# Patient Record
Sex: MALE | Race: WHITE
[De-identification: names, ages, dates, MRNs, and addresses within clinical notes are randomized per-mention and may not be internally consistent; named-entity substitution may affect disease eponyms.]

---

## 2018-06-14 ENCOUNTER — HOSPITAL ENCOUNTER (OUTPATIENT)
Dept: HOSPITAL 53 - M SFHCLERA | Age: 78
End: 2018-06-14
Attending: FAMILY MEDICINE
Payer: MEDICARE

## 2018-06-14 DIAGNOSIS — Z79.01: ICD-10-CM

## 2018-06-14 DIAGNOSIS — Z51.81: ICD-10-CM

## 2018-06-14 DIAGNOSIS — Z86.718: ICD-10-CM

## 2018-06-14 DIAGNOSIS — F43.21: ICD-10-CM

## 2018-06-14 DIAGNOSIS — M79.89: ICD-10-CM

## 2018-06-14 DIAGNOSIS — I10: Primary | ICD-10-CM

## 2018-06-14 LAB
ALBUMIN/GLOBULIN RATIO: 1 (ref 1–1.93)
ALBUMIN: 3.5 GM/DL (ref 3.2–5.2)
ALKALINE PHOSPHATASE: 89 U/L (ref 45–117)
ALT SERPL W P-5'-P-CCNC: 32 U/L (ref 12–78)
ANION GAP: 9 MEQ/L (ref 8–16)
AST SERPL-CCNC: 23 U/L (ref 7–37)
BASO #: 0 10^3/UL (ref 0–0.2)
BASO %: 0.4 % (ref 0–1)
BILIRUBIN,TOTAL: 0.3 MG/DL (ref 0.2–1)
BLOOD UREA NITROGEN: 25 MG/DL (ref 7–18)
CALCIUM LEVEL: 8.7 MG/DL (ref 8.8–10.2)
CARBON DIOXIDE LEVEL: 26 MEQ/L (ref 21–32)
CHLORIDE LEVEL: 109 MEQ/L (ref 98–107)
CHOLEST SERPL-MCNC: 210 MG/DL (ref ?–200)
CHOLESTEROL RISK RATIO: 4.77 (ref ?–5)
CREAT UR-MCNC: 131 MG/DL
CREATININE FOR GFR: 1.33 MG/DL (ref 0.7–1.3)
EOS #: 0.1 10^3/UL (ref 0–0.5)
EOSINOPHIL NFR BLD AUTO: 1.7 % (ref 0–3)
EST. AVERAGE GLUCOSE BLD GHB EST-MCNC: 123 MG/DL (ref 60–110)
GFR SERPL CREATININE-BSD FRML MDRD: 55.2 ML/MIN/{1.73_M2} (ref 42–?)
GLUCOSE, FASTING: 114 MG/DL (ref 70–100)
HDLC SERPL-MCNC: 44 MG/DL (ref 40–?)
HEMATOCRIT: 40.1 % (ref 42–52)
HEMOGLOBIN: 13.5 G/DL (ref 13.5–17.5)
IMMATURE GRANULOCYTE %: 0.4 % (ref 0–3)
LDL CHOLESTEROL: 132.8 MG/DL (ref ?–100)
LYMPH #: 1.8 10^3/UL (ref 1.5–4.5)
LYMPH %: 25.4 % (ref 24–44)
MEAN CORPUSCULAR HEMOGLOBIN: 32.6 PG (ref 27–33)
MEAN CORPUSCULAR HGB CONC: 33.7 G/DL (ref 32–36.5)
MEAN CORPUSCULAR VOLUME: 96.9 FL (ref 80–96)
MICROALBUMIN UR-MCNC: 16.1 MG/L
MICROALBUMIN/CREAT UR: 12.2 MCG/MG (ref 0–30)
MONO #: 1 10^3/UL (ref 0–0.8)
MONO %: 14.6 % (ref 0–5)
NEUTROPHILS #: 4.1 10^3/UL (ref 1.8–7.7)
NEUTROPHILS %: 57.5 % (ref 36–66)
NONHDLC SERPL-MCNC: 166 MG/DL
NRBC BLD AUTO-RTO: 0 % (ref 0–0)
PLATELET COUNT, AUTOMATED: 199 10^3/UL (ref 150–450)
POTASSIUM SERUM: 4.4 MEQ/L (ref 3.5–5.1)
RED BLOOD COUNT: 4.14 10^6/UL (ref 4.3–6.1)
RED CELL DISTRIBUTION WIDTH: 14.3 % (ref 11.5–14.5)
SODIUM LEVEL: 144 MEQ/L (ref 136–145)
THYROID STIMULATING HORMONE: 3.33 UIU/ML (ref 0.36–3.74)
TOTAL PROTEIN: 7 GM/DL (ref 6.4–8.2)
TRIGLYCERIDES LEVEL: 166 MG/DL (ref ?–150)
WHITE BLOOD COUNT: 7.1 10^3/UL (ref 4–10)

## 2018-06-14 PROCEDURE — 84443 ASSAY THYROID STIM HORMONE: CPT

## 2019-11-11 NOTE — REP
Right lower extremity duplex venous ultrasound:

 

History:  History of chronic DVT right lower extremity.  No comparison study.

 

Findings:  There is a small area of focal wall irregularity in the distal femoral

vein with lack of complete.  Color Doppler filling.  This is compatible with some

residual mural thrombus or mural thickening post old DVT.  No acute venous

thrombosis is seen.  The veins are otherwise anechoic and fully compressible on

two-dimensional scanning.  Color flow imaging is otherwise negative.  Spectral

Doppler interrogation demonstrates intact respiratory variation in flow normal

manual augmentation of flow.

 

Impression:

 

There is no evidence of acute deep venous thrombosis.  There is a small 1 cm area

of wall thickening in the distal femoral vein which may be a reflection of prior

DVT with chronic wall thickening.

 

 

Electronically Signed by

Johan Oviedo MD 11/11/2019 09:15 A

## 2020-10-13 NOTE — ECGEPIP
[facility] - ED

                                       

                                       Test Date:    2020-10-13

Pat Name:     VIRGINIA MEDRANO          Department:   

Patient ID:   [id number]                 Room:         -

Gender:       Male                     Technician:   JBEN

:          1940               Requested By: MARYCRUZ BARNES

Order Number: [id number]     Reading MD:   Maria Luisa Zhang

                                 Measurements

Intervals                              Axis          

Rate:         79                       P:            53

NY:           146                      QRS:          0

QRSD:         85                       T:            17

QT:           362                                    

QTc:          417                                    

                           Interpretive Statements

SINUS RHYTHM

NO PRIOR

Electronically Signed on 10- 20:53:26 EDT by Maria Luisa Zhang

## 2020-10-13 NOTE — HPEPDOC
[facility] Medical History & Physical


Date of Admission


Oct 13, 2020


Date of Service:  Oct 13, 2020





History and Physical


CHIEF COMPLAINT: Sent by PCP for symptomatic anemia, abnormal Hgb.





HISTORY OF PRESENT ILLNESS: 79 yo male sent by his PCP on routine follow up of 

blood work which showed anemia with Hgb 6.1 in office. Patient does note one 

week of dizziness and light headedness. He is not sure if he is taking any blood

thinners. He was diagnosed with an unprovoked RLE DVT in 2013, and was on 

Xarelto. The DOAC was discontinued secondary to lower GI bleed/melena? bleeding 

risk? in November 2019. He is not aware currently of any bloody stools. Physical

exam in ED did reveal gross blood in the rectal vault. He denies chest pain, 

changes in vision, falls.





PAST MEDICAL HISTORY:


#unprovoked DVT - RLE - 2013, DOAC was discontinued November 2019 due to high 

risk of bleeding as per PCP notes


#HTN





ALLERGIES: Please see below.





REVIEW OF SYSTEMS:


Negative except as per HPI.





HOME MEDICATIONS: Please see below. 





PHYSICAL EXAMINATION:


VITAL SIGNS: See below


General: NAD, lying comfortably in bed


HEENT: NC/AT, EOMI,k poor dentition


Lungs: CTA B/L


Heart: +S1S2, RRR


Abd: soft, NT, +BS


Ext: no edema








LABORATORY DATA: See below.





MICROBIOLOGY: Please see below. 





ASSESSMENT: 79 yo male sent from his PCP for symptomatic anemia, with PMHx of 

unprovoked RLE DVT, HTN.





#acute blood loss anemia


- transfuse PRBC


- serial H/H


- surgery consultation for possible scope


- NPO/IV fluids





#HTN


- holding home medications - lisinopril, HCTZ





#ELISHA/CKD


- follow BMP


- hold nephrotoxic meds


- CT A/P when H/H improved, or consider bedside US





#DVT prophylaxis


- mechanical





Vital Signs





Vital Signs








  Date Time  Temp Pulse Resp B/P (MAP) Pulse Ox O2 Delivery O2 Flow Rate FiO2


 


10/13/20 15:52   16     


 


10/13/20 15:45    132/61 (84)    


 


10/13/20 15:39  78   98   


 


10/13/20 14:09 99.1     Room Air  











Laboratory Data


Labs 24H


Laboratory Tests 2


10/13/20 14:39: 


Immature Granulocyte % (Auto) 0.6, Neutrophils (%) (Auto) 60.3, Lymphocytes (%) 

(Auto) 20.9L, Monocytes (%) (Auto) 14.8H, Eosinophils (%) (Auto) 3.0, Basophils 

(%) (Auto) 0.4, Neutrophils # (Auto) 4.8, Lymphocytes # (Auto) 1.7, Monocytes # 

(Auto) 1.2H, Eosinophils # (Auto) 0.2, Basophils # (Auto) 0.0, Nucleated Red 

Blood Cells % (auto) 0.0, Prothrombin Time 14.3H, Prothromb Time International 

Ratio 1.09, Activated Partial Thromboplast Time 28.9, Anion Gap 9, Glomerular 

Filtration Rate 21.2L, Calcium Level 8.2L, Total Bilirubin 0.2, Direct Bilirubin

< 0.1, Aspartate Amino Transf (AST/SGOT) 14, Alanine Aminotransferase (ALT/SGPT)

10L, Alkaline Phosphatase 67, Total Creatine Kinase 87, Creatine Kinase MB 1.6, 

Creatine Kinase MB Relative Index 1.84, Troponin I < 0.02, Total Protein 6.2L, 

Albumin 2.5L, Albumin/Globulin Ratio 0.7


CBC/BMP


Laboratory Tests


10/13/20 14:39











Home Medications


Scheduled PRN


Acetaminophen (Tylenol Extra Strength) 500 Mg Tablet, 500 MG PO Q6H PRN for 

HEADACHE





Miscellaneous Medications


Hydrochlorothiazide (Hydrochlorothiazide) 12.5 Mg Capsule


Lisinopril (Lisinopril) 10 Mg Tablet





Allergies


Coded Allergies:  


     sulfamethoxazole (Verified  Allergy, Unknown, 10/13/20)





A-FIB/CHADSVASC


A-FIB History


Current/History of A-Fib/PAF?:  No


Current PO Anticoag Therapy:  ISHA Jones MD              Oct 13, 2020 16:00

## 2020-10-14 NOTE — IPNPDOC
Text Note


Date of Service


The patient was seen on 10/14/20.





NOTE


Subjective:


Patient seen and examined at bedside. No acute overnight events reported. No new

medical complaints. Patient states he had a bowel movement, reported to be 

bubba in color.





Objective:


VITAL SIGNS: See below


General: NAD, lying comfortably in bed, in good spirits


HEENT: NC/AT, EOMI, poor dentition


Lungs: CTA B/L


Heart: +S1S2, RRR


Abd: soft, NT, +BS


Ext: no edema





ASSESSMENT: 81 yo male sent from his PCP for symptomatic anemia, with PMHx of 

unprovoked RLE DVT, HTN.





#acute blood loss anemia


- s/p 2 units PRBC


- serial H/H


- surgery consultation for scope


- NPO/IV fluids


- in ED reported to have bright red blood in rectal vault, last BM reported to 

be bubba in color





#HTN


- holding home medications - lisinopril, HCTZ





#ELISHA/CKD


- creatinine worsened


- follow BMP


- hold nephrotoxic meds


- UA pending


- nephrology c/s pending


- CT A/P when H/H improved, or consider bedside US





#DVT prophylaxis


- mechanical





VS,Fishbone, I+O


VS, Fishbone, I+O


Laboratory Tests


10/13/20 14:39








10/13/20 21:07








10/14/20 04:48











Vital Signs








  Date Time  Temp Pulse Resp B/P (MAP) Pulse Ox O2 Delivery O2 Flow Rate FiO2


 


10/14/20 08:00 98.4 65 16 145/69 (94) 97 Room Air  














I&O- Last 24 Hours up to 6 AM 


 


 10/14/20





 06:00


 


Intake Total 3395 ml


 


Balance 3395 ml

















ISHA CAMEJO MD              Oct 14, 2020 10:02

## 2020-10-14 NOTE — CR
DATE OF CONSULTATION:  10/13/2020



REASON FOR CONSULTATION:  Requesting by Dr. Timmons in the Intensive Care Unit 
for marked anemia and possible gastrointestinal (GI) bleeding.

 

HISTORY OF PRESENT ILLNESS:  The patient is an 80-year-old man who was admitted 
from the emergency department by Dr. Timmons today for marked anemia.  He had 
been seen by his primary physician last Thursday or Friday, the 8th or 9th of October.  Some blood work was ordered which was done on the 12th and revealed a 
markedly low hemoglobin. He was called and advised to report to the emergency 
department.  In the [facility] Emergency Department at approximately 2:30 in the 
afternoon on the 13th, he had a CBC that showed a hemoglobin of 5.8 with a 
hematocrit of 19. He was therefore admitted by the hospitalist for management 
with transfusion.  The patient does report that he has had some lightheadedness 
recently. He has noted some dark stool but with me denied having noted any 
definite bleeding.  He does report that he has had problems with leakage of 
stool and incontinence of stool over the last six months or so. He does report 
that over the last few months he has also had two episodes of falling perhaps 
most recently about a month ago though he is somewhat unclear on the timing.  He
denies any abdominal pain and has had no nausea or vomiting.  He has been eating
well.  So far he has received 2 units of packed red blood cells and is pending 
recheck of his hemoglobin and hematocrit.  The patient does report that his 
father had colon cancer at about age 79. The patient reports he has never had a 
colonoscopy though it was apparently recommended at several points in his care.



MEDICATIONS PRIOR TO ADMISSION: 

-   Lisinopril 10 mg p.o. daily 

-   Hydrochlorothiazide 12.5 mg daily 

-   Tylenol as needed for headache 



ALLERGIES:  Only reported allergy is to SULFAMETHAXOZOLE.

 

SURGICAL HISTORY:  Entirely negative. 



MEDICAL HISTORY:  The patient has a history of hypertension.  He had a right 
lower extremity deep venous thrombosis back in 2013 for which he received 
anticoagulation. This was discontinued in November of 2019 as it was apparently 
estimated that his risk of bleeding was perhaps higher than his risk from a 
repeat DVT. 



FAMILY HISTORY:  Significant in that his father had colon cancer.



SOCIAL HISTORY:  The patient denies any smoking. He denies significant alcohol 
use. The patient lives alone in a trailer home.  He has been responsible for all
of his own mowing and snow blowing but is now feeling somewhat weaker and has 
been using a cane for ambulation and feeling less secure at home.



REVIEW OF SYSTEMS:   Shows no chest pain or palpitations.  He denies seizure or 
stroke. He does report some pain in his hips with ambulation with weakness in 
the muscles.  He has been very troubled by some loss of control of his bowel and
bladder function over the last six months or so.  He has bee n able to eat and 
denies any nausea or vomiting, fevers or chills.



PHYSICAL EXAMINATION:   

General: A somewhat frail-appearing older man.  He is lying quietly on the 
hospital bed when I walked in.

 His most recent vital signs show a temperature of 98.4, pulse of 67, 
respirations of 20 and a blood pressure of 152/73. 

Skin is warm and dry. He does appear somewhat disheveled and unshaven. His teeth
are in poor repair. Sclerae are anicteric.

Neck is without bruits or mass.

Heart exam shows a regular rhythm.

The lungs show clear breath sounds bilaterally.

The abdomen is quite protuberant.  He has bowel sounds present.  The abdomen is 
nontender to palpation and is soft.  There is no definite hernia identified.  

Genital and rectal exams are deferred at this time. 

Extremities show no edema and he has palpable radial and dorsalis pedis pulses 
bilaterally. 



LABORATORY DATA:  Laboratory studies show a hemoglobin of 5.8, hematocrit of 19 
and a white count of 8.0 at the time of admission. His platelet count is 239 and
the differential count shows 60% neutrophils, 21% lymphocytes and 15% monocytes.
 Chemistry profile showed sodium 139, potassium 4.0, chloride 106, CO2 of 24, 
BUN of 54, creatinine 3.0 and a glucose of 127.  Liver function tests are 
normal. Total protein is 6.2 with an albumin of 2.5.  Coagulation studies show a
PT of 14.3 but an INR of 1.0. Review of the patient's records over the last two 
years showed that his chemistries revealed a BUN of 28 and a creatinine of 1.5 
on his last available labs in November of 2019.



IMPRESSION:  The patient has fairly marked anemia with a hemoglobin of 5.8 and a
hematocrit of 19. He has not had any obvious brisk bleeding but has noted some 
dark stool. He has been somewhat lightheaded at times recently and did have two 
falls within the last couple months.  He has some degree of chronic kidney 
disease with a normal BUN one year ago of 28 and a creatinine of 1.5 and those 
have now elevated to 54 and 3.0.  It is unclear how much of this represents an 
acute change possibly related to bleeding and how much of this might represent a
chronic decrease in his renal function. The patient has a family history of 
colon cancer in his father and has noted some changes in his bowel habits with 
loss of control of his bowel function over the last six months or so.  His 
abdomen is quite protuberant but I do not feel a definite mass.  I think he 
certainly is at a significant risk for colorectal cancer and this is certainly a
very valid concern.



PLAN:  I discussed with the patient scheduling him for a colonoscopy and 
possibly an accompanying upper endoscopy to look for any source of bleeding.  He
is agreeable with this plan although he does allow that if we identified a 
tumor, he does not believe he would want to have any treatment for it at his 
age. I certainly agree with a transfusion at this point to get him to a safe 
level. We will see how his renal functions level out with transfusion and 
hydration.  I will need to find time in the endoscopy schedule and this will 
likely be within the next 48 hours. 

ALYSON

## 2020-10-15 NOTE — CR
DATE OF CONSULTATION:  10/14/2020



CONSULTATION REQUESTED BY:  Jarrell Timmons M.D. 



REASON FOR CONSULTATION:  Acute renal failure.



HISTORY OF PRESENT ILLNESS:  Mr. Hanna is an 80-year-old gentleman with known
history of prior DVT, hypertension and chronic kidney disease. He is admitted 
due to severe anemia as he was noticed to have a hemoglobin of 6.1 by his 
primary care physician. He was sent to the Emergency Room and has been admitted.
He has been transfused and currently waiting for possible endoscopy. Patient was
on Xarelto for history of DVT in 2013, which is currently on hold. A nephrology 
consultation was requested this morning and patient is seen in the Intensive 
Care Unit on his bedside. 



PAST MEDICAL AND SURGICAL HISTORY:  Significant for:

1.   History of DVT in right lower extremity in 2013.

2.   History of chronic kidney disease at baseline.

3.   History of hypertension.



FAMILY HISTORY:  Noncontributory for this admission.



PERSONAL AND SOCIAL HISTORY:  Patient denies any alcohol or drug use. 



ALLERGIES:  Patient has allergy to sulfa.



HOME MEDICATIONS:  Included:

1.   Lisinopril 10 mg daily.

2.   Hydrochlorothiazide 12.5 mg daily.

3.   Tylenol as needed for pain.

4.   Xarelto.



REVIEW OF SYSTEMS:  At present, patient denies any dyspnea, chest pain, fever or
chills. Ears, Nose and Throat unremarkable. He denies any headache. Respiratory 
system is negative for cough or hemoptysis. GI system is significant for 
hemorrhoids with history of bleeding. He denies any black colored stools. There 
is no nausea or vomiting.  system is significant for decreased urine output. 
Musculoskeletal system is negative for leg edema. He denies any use of NSAID. 
Endocrine system negative for diabetes or thyroid problems. Hematological system
is significant for severe anemia, requiring transfusions. Psychosocial system 
negative for depression or anxiety. Neurological system negative for seizures or
stroke.  Skin is negative for rash or ulcers.



PHYSICAL EXAMINATION: 

GENERAL: An elderly gentleman lying in the bed without any acute distress. 

VITALS: Temperature 98.4 degrees Fahrenheit, heart rate 65 per minute, 
respiratory rate 16 per minute, blood pressure 145/69 mmHg and oxygen saturation
97% on room air. 

HEENT: Head is atraumatic. Neck is supple and without JVD or thyroid 
enlargement. Pupils equal and reactive to light. Sclerae anicteric. 

HEART: Sounds are regular.

LUNGS: Sound clear to auscultation.

ABDOMEN: Soft and nontender. Bowel sounds are normal. There is no palpable 
organomegaly. 

EXTREMITIES: No cyanosis or clubbing. 

NEUROLOGIC: He is awake, alert and oriented x3. 



LABORATORY DATA:  On admission, hemoglobin 5.8 and hematocrit 19. Platelets 
239,000. Today hemoglobin 7.9 and hematocrit 24.8. Sodium 141, potassium 4.1, 
CO2 21, BUN 58, creatinine 3.65, calcium 7.7. Iron 116, saturation 45%. Total 
protein 5.6 and albumin 2.0. . There is no urinalysis at this point.



PROBLEMS:  

1.  Acute renal failure possibly superimposed on chronic kidney disease: Most 
likely this is related to acute blood loss and severe anemia. He was most likely
dehydrated. Patient was also using ACE inhibitor and diuretic at home. I agree 
with stopping both the ACE inhibitor and diuretic. I will recommend aggressive 
I.V. fluid hydration in addition to transfusion to maintain his hemoglobin above
8 and systolic blood pressure about 130 mmHg. A urinalysis is being ordered to 
further assess his baseline kidney function for possible proteinuria or 
hematuria.

2.  Hypertension: Blood pressure is reasonably well controlled and at this point
I will hold off on diuretic and ACE inhibitor. 

3.  Acute blood loss anemia: Patient has been transfused and is likely to 
require further transfusion. I would suggest to maintain his hemoglobin at least
above 8.0.  He is n.p.o. at present, waiting for possible endoscopy. He is quite
upset about being n.p.o. 



Thank you for involving me in the care of Mr. Hanna. I will follow him along 
with you.

ALYSON

## 2020-10-15 NOTE — IPNPDOC
Text Note


Date of Service


The patient was seen on 10/15/20.





NOTE


Subjective:


Patient seen and examined at bedside. No acute overnight events reported. No new

medical complaints.





Objective:


VITAL SIGNS: See below


General: NAD, lying comfortably in bed, in good spirits


HEENT: NC/AT, EOMI, poor dentition


Lungs: CTA B/L


Heart: +S1S2, RRR


Abd: soft, NT, +BS


Ext: no edema





ASSESSMENT: 81 yo male sent from his PCP for symptomatic anemia, with PMHx of 

unprovoked RLE DVT, HTN.





#acute blood loss anemia


- s/p 2 units PRBC


- H/H still trending down - transfuse additional 1 PRBC today


- surgery consultation for scope - plan for colonoscopy/EGD today


- clear liquids





#HTN


- holding home medications - lisinopril, HCTZ





#ELISHA/CKD


- creatinine worsened


- follow BMP


- hold nephrotoxic meds


- UA noted


- nephrology c/s appreciated


- CT A/P when H/H improved, or consider bedside US





#DVT prophylaxis


- mechanical





VS,Fishbone, I+O


VS, Fishbone, I+O


Laboratory Tests


10/14/20 13:46








10/14/20 22:22








10/15/20 05:31











Vital Signs








  Date Time  Temp Pulse Resp B/P (MAP) Pulse Ox O2 Delivery O2 Flow Rate FiO2


 


10/15/20 06:00 99.4 71 18 140/70 (93) 98 Room Air  














I&O- Last 24 Hours up to 6 AM 


 


 10/15/20





 06:00


 


Intake Total 2910 ml


 


Output Total 210 ml


 


Balance 2700 ml

















ISHA CMAEJO MD              Oct 15, 2020 10:17

## 2020-10-15 NOTE — ROOR
________________________________________________________________________________

Patient Name: Gustavo Hanna          Procedure Date: 10/15/2020 5:43 PM

MRN: [id number]                          Account Number: [id number]

YOB: 1940               Age: 80

                                       Gender: Male

Note Status: Finalized                 

________________________________________________________________________________

 

Procedure:           Colonoscopy

Indications:         Rectal bleeding, Iron deficiency anemia secondary to 

                     chronic blood loss

Providers:           Dionicio Hay MD

Referring MD:        Jarrell Timmons MD

Requesting Provider: 

Medicines:           Monitored Anesthesia Care

Complications:       No immediate complications.

________________________________________________________________________________

Procedure:           Pre-Anesthesia Assessment:

                     - Prior to the procedure, a History and Physical was 

                     performed, and patient medications and allergies were 

                     reviewed. The patient is competent. The risks and 

                     benefits of the procedure and the sedation options and 

                     risks were discussed with the patient. All questions were 

                     answered and informed consent was obtained. Patient 

                     identification and proposed procedure were verified by 

                     the physician, the nurse and the anesthetist in the 

                     procedure room. Mental Status Examination: alert and 

                     oriented. Prophylactic Antibiotics: The patient does not 

                     require prophylactic antibiotics. Prior Anticoagulants: 

                     The patient has taken no previous anticoagulant or 

                     antiplatelet agents. ASA Grade Assessment: III - A 

                     patient with severe systemic disease. After reviewing the 

                     risks and benefits, the patient was deemed in 

                     satisfactory condition to undergo the procedure. The 

                     anesthesia plan was to use monitored anesthesia care 

                     (MAC). Immediately prior to administration of 

                     medications, the patient was re-assessed for adequacy to 

                     receive sedatives. The heart rate, respiratory rate, 

                     oxygen saturations, blood pressure, adequacy of pulmonary 

                     ventilation, and response to care were monitored 

                     throughout the procedure. The physical status of the 

                     patient was re-assessed after the procedure.

                     The Colonoscope was introduced through the anus with the 

                     intention of advancing to the cecum. The scope was 

                     advanced to the descending colon before the procedure was 

                     aborted. Medications were given. The colonoscopy was 

                     unusually difficult due to excessive bleeding, multiple 

                     diverticula in the colon and poor bowel prep with stool 

                     present. Successful completion of the procedure was aided 

                     by lavage.

                                                                                

Findings:

     The digital rectal exam revealed a rectal mass.

     There is a large circumferential rectal mass which begins just above the 

     dentate line and extends as far as I can feel. There is old and fresh 

     blood on the examiing finger.

     Many small and large-mouthed diverticula were found in the sigmoid colon.

     a large amount of brown stool without blood stool was found in the 

     sigmoid colon, interfering with visualization. Lavage of the area was 

     performed using a moderate amount, resulting in incomplete clearance with 

     continued poor visualization.

     An infiltrative, sessile and ulcerated non-obstructing large mass was 

     found in the distal rectum. The mass was circumferential. The mass 

     measured eight cm in length. Oozing was present. Biopsies were taken with 

     a cold forceps for histology.

                                                                                

Impression:          - Rectal mass.

                     - Diverticulosis in the sigmoid colon.

                     - Stool in the sigmoid colon.

                     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 Biopsied.

Recommendation:      - Admit the patient to [facility] for [facility] care.

                                                                                

 

Dionicio Hay MD

__________________

Dionicio Hay MD

10/15/2020 7:26:51 PM

Electronically signed by Dionicio Hay MD

Number of Addenda: 0

 

Note Initiated On: 10/15/2020 5:43 PM

Estimated Blood Loss:

     Estimated blood loss was minimal.

## 2020-10-15 NOTE — REPVR
PROCEDURE INFORMATION: 

Exam: CT Abdomen And Pelvis Without Contrast 

Exam date and time: 10/15/2020 10:55 PM 

Age: 80 years old 

Clinical indication: Abdominal pain; Generalized; Additional info: Oliguria, bi 

bleed, acute kidney injury 



TECHNIQUE: 

Imaging protocol: Computed tomography of the abdomen and pelvis without 

contrast. 

Radiation optimization: All CT scans at this facility use at least one of these 

dose optimization techniques: automated exposure control; mA and/or kV 

adjustment per patient size (includes targeted exams where dose is matched to 

clinical indication); or iterative reconstruction. 



COMPARISON: 

No relevant prior studies available. 



FINDINGS: 

Lungs: Slight bibasilar interstitial coarsening with minimal fibro-atelectatic 

change. 

Pleural space: Trace left pleural effusion which may be loculated. 



Liver: There are several hepatic cysts measuring up to 2.2 cm in the cephalad 

left hepatic lobe with a Hounsfield measurement of -3. 

Gallbladder and bile ducts: Focal gallbladder fundal contraction with wall 

thickening. 

Pancreas: Punctate pancreatic calcifications. 

Spleen: Normal. No splenomegaly. 

Adrenals: Normal. No mass. 

Kidneys and ureters: Small nonobstructing right renal calculus. There is a left 

renal cyst measuring up to 5.0 cm with a Hounsfield measurement of 2 consistent 

with a simple cyst. No follow-up is recommended. Prominent bilateral 

hydronephrosis and hydroureter is which extend to the UVJs. 

Stomach and bowel: Borderline distention of the stomach with food material. 

Density at the cecal tip suggesting prior appendectomy. The air-fluid levels 

throughout the colon. There is colonic diverticulosis without evidence of 

diverticulitis. 

Appendix: The appendix is not seen. 



Intraperitoneal space: Unremarkable. No free air. No significant fluid 

collection. 

Vasculature: Unremarkable. No abdominal aortic aneurysm. 

Lymph nodes: Unremarkable. No enlarged lymph nodes. 

Urinary bladder: There is prominent distention of the urinary bladder which has 

a lobular configuration which are likely the distended diverticula. 

Reproductive: There is moderate enlargement of the prostate, particularly the 

median lobe. 

Bones/joints: Healing fractures 10th and 11th ribs posteriorly and the left 8th 

and 9th ribs laterally. Moderate compression of L2 and mild central compression 

of T8 and slightly decreased height of T9 and T10 which appear to be chronic. 

Soft tissues: Small fat filled umbilical hernia. 



IMPRESSION: 

1. Moderate prostatic enlargement, particularly the median lobe. 

2. Prominent lobular distention of the urinary bladder which extends above the 

umbilicus. 

3. Prominent bilateral hydronephrosis and hydroureter is to the UVJs which is 

likely reflection of bladder distention. 

4. Minimal nonobstructing right renal calculus. 

5. Focal wall thickening and protrusion at the gallbladder fundus suggesting 

adenomyomatosis. 

6. Slight bibasilar interstitial coarsening with minimal fibro-atelectatic 

change and trace left pleural effusion which appears loculated. There are 

adjacent healing fractures of several lower left ribs. 

7. Punctate pancreatic calcifications suggesting previous pancreatitis. 

8. Air-fluid levels throughout much of the colon suggesting diarrhea. 

9. Colonic diverticulosis without diverticulitis. 



COMMENTS: 

Consistent with the American College of Radiology's Incidental Findings 

Committee white paper (J Am Mitesh Radiol 2018): Any incidental renal lesion less 

than 1 cm or classified as too small to characterize, or any incidental cystic 

renal lesion characterized as simple-appearing, is likely benign. No follow-up 

imaging is recommended for these lesions per consensus recommendations based on 

imaging criteria. 



Electronically signed by: Jaron Mauricio On 10/15/2020  23:50:30 PM

## 2020-10-15 NOTE — IPN
DATE:  10/15/2020



Mr. Hanna is seen this morning on his bedside. He is sitting in the chair at 
the time of my visit. He has a Doss catheter, which was placed last night, 
however, has minimal urine output. Patient denies any dyspnea or chest pain. He 
is waiting for endoscopy today. He was transfused 2 units of packed red blood 
cells (RBCs) yesterday due to a hemoglobin of 7.5. He denies any dyspnea or 
chest pain at present. He has no nausea or vomiting and is tolerating liquids 
orally well. 



PHYSICAL EXAMINATION:  Temperature 96.3 degrees Fahrenheit, heart rate 76 per 
minute, and respiratory rate 20 per minute, blood pressure 148/76 mm of mercury,
and oxygen saturation 99% on room air. Head is atraumatic. Neck is supple, and 
jugular venous distention (JVD) is mildly elevated sitting upright. His lungs 
sound relatively clear with only few basilar rales. Heart sounds are regular. 
Abdomen is some, somewhat distended, and bowel sounds are normal. Extremities 
have no cyanosis or clubbing. Neurologically, he is awake, alert, and oriented 
times three. 



Today's labs show WBC count 7.0, hemoglobin 7.5, hematocrit 24.0. Sodium 142, 
potassium 4.8, CO2 of 20, BUN 66, and creatinine 5.27. His iron level was 116 
and saturation 45%. 



PROBLEMS:

1. Acute renal failure superimposed on chronic kidney disease. Patient has 
oliguric acute renal failure without any obvious reason. He did not receive any 
intravenous (IV) contrast and denies using any nonsteroidal anti-inflammatory 
drugs (NSAIDs). He was not on any nephrotoxic medications at home. In any event,
he already has a Doss catheter, so bladder neck obstruction is ruled out. A CT 
scan of abdomen and pelvis is pending at present. Patient denies any overt 
uremic symptoms. I have discussed with him and explained to him about potential 
need for dialysis if his kidney function does not improve. So far, his 
electrolytes are stable, and he does not seem to have any significant metabolic 
acidosis. He is clinically not volume overloaded.



2. Anemia. His anemia is stable but not improved. We will give him one more unit
of packed RBCs today and followup with his complete blood count (CBC).



3. Hypertension. Blood pressure seems reasonably well controlled. We will 
continue to monitor closely and adjust his medications as needed. At home he was
taking lisinopril and hydrochlorothiazide, and both of them are currently 
stopped due to acute kidney injury.

ALYSON

## 2020-10-16 NOTE — REPVR
PROCEDURE INFORMATION: 

Exam: US Abdomen, Limited; Right Upper Quadrant 

Exam date and time: 10/16/2020 6:12 PM 

Age: 80 years old 

Clinical indication: Abnormal findings; Abnormal radiologic finding of the 

abdomen; Radiologic exam and body structure: CT abdomen/pelvis; Additional 

info: Further eval liver cysts on CT 



TECHNIQUE: 

Imaging protocol: US abdomen. Real time ultrasound with image documentation. 

Limited exam focused on the right upper quadrant. 



COMPARISON: 

CT ABD PELVIS W/O CONTRAST 10/15/2020 10:49 PM 



FINDINGS: 

Liver: Scattered simple cysts, measuring up to 2.3 x 1.5 x 2.6 cm in the left 

hepatic lobe. 

Gallbladder: No gallstones. No gallbladder wall thickening or pericholecystic 

fluid. Negative sonographic Tamayo's sign, as per the performing sonographer. 

Common bile duct: No stones. No ductal dilatation. 

Pancreas: Unremarkable as visualized. 

Right kidney: 1 x 0.6 x 0.8 cm cyst in the midportion. No solid mass. No 

definite stones. Moderate hydronephrosis. 



IMPRESSION: 

1. Moderate right-sided hydronephrosis. 

2. Scattered simple hepatic cysts.

3. Additional findings, as above. 



Electronically signed by: J Luis Truong On 10/16/2020  18:28:35 PM

## 2020-10-16 NOTE — IPNPDOC
Text Note


Date of Service


The patient was seen on 10/16/20.





NOTE


Subjective:


Patient seen and examined at bedside. No acute overnight events reported. No new

medical complaints.





Objective:


VITAL SIGNS: See below


General: NAD, lying comfortably in bed, in good spirits


HEENT: NC/AT, EOMI, poor dentition


Lungs: CTA B/L


Heart: +S1S2, RRR


Abd: soft, NT, +BS


Ext: no edema





ASSESSMENT: 81 yo male sent from his PCP for symptomatic anemia, with PMHx of 

unprovoked RLE DVT, HTN.





#acute blood loss anemia


- s/p 3 units PRBC - relative stable at this time


- s/p colonoscopy - revealed rectal mass





#rectal mass


- follow as per surgery - suspect will require surgery to remove entire rectum


- oncology c/s pending - spoke with Dr. Sim - assistance appreciated - CT 

chest pending, CEA pending





#liver lesions


- cysts? metastatic disease


- liver US pending for further eval





#prostate disease


- PSA screen pending


- urology c/s 





#ELISHA/CKD


#obstructive uropathy


- CT A/P noted from yesterday


- minimally improved today


- laureano catheter place - follow as per urology


- creatinine slightly better - follow as per nephrology - assistance appreciated





#HTN


- holding home medications - lisinopril, HCTZ


- if needed will consider amlodipine





#DVT prophylaxis


- mechanical





VS,Fishbone, I+O


VS, Fishbone, I+O


Laboratory Tests


10/16/20 00:10








10/16/20 07:48











Vital Signs








  Date Time  Temp Pulse Resp B/P (MAP) Pulse Ox O2 Delivery O2 Flow Rate FiO2


 


10/16/20 06:00 97.3 78 18 145/75 (98) 98 Room Air  














I&O- Last 24 Hours up to 6 AM 


 


 10/16/20





 06:00


 


Intake Total 1105 ml


 


Output Total 2175 ml


 


Balance -1070 ml

















ISHA CAMEJO MD              Oct 16, 2020 12:09

## 2020-10-16 NOTE — REPVR
PROCEDURE INFORMATION: 

Exam: CT Chest Without Contrast 

Exam date and time: 10/16/2020 1:08 PM 

Age: 80 years old 

Clinical indication: Evaluate metastatic disease. Recently found rectal mass. 



TECHNIQUE: 

Imaging protocol: Computed tomography of the chest without contrast. Coronal 

and sagittal reformats were created and reviewed. 

3D rendering (Not supervised by radiologist): MIP and/or 3D reconstructed 

images were created by the technologist. 

Radiation optimization: All CT scans at this facility use at least one of these 

dose optimization techniques: automated exposure control; mA and/or kV 

adjustment per patient size (includes targeted exams where dose is matched to 

clinical indication); or iterative reconstruction. 



COMPARISON: 

1. CR Chest, 2 view PA, Lat 12/5/2013 3:13 PM 

2. CT ABD PELVIS W/O CONTRAST 10/15/2020 10:49:55 PM 



FINDINGS: 

Thyroid: Unremarkable as visualized. 

Lungs: Small retained secretions or aspirated fluid within the trachea and left 

mainstem bronchus. Very mild bilateral lower lobe dependent atelectasis. Left 

upper lobe spiculated solid 6 mm nodule (series 201, image 23). Left upper lobe 

apical ground-glass 5 mm nodule (image 20). Right lower lobe solid 3 mm nodule 

(image 55). Right lower lobe solid juxtapleural 4 mm nodule (image 42). There 

are multiple pulmonary micronodules (measuring less than 3 mm) some examples 

include: left upper lobe images 17, 26, and 51; right middle lobe image 66. 

Pleural space: Very small dependently layering low-attenuation bilateral 

pleural effusions. No pneumothorax. Mild biapical pleural/parenchymal scarring. 

Heart: Coronary arterial atherosclerotic calcifications are present. Heart size 

is within normal limits. No pericardial effusion. 

Mediastinal space: The esophagus is unremarkable. 

Pulmonary arteries: The main pulmonary arterial trunk is dilated measuring 3.2 

cm in diameter. 

Aorta: Tortuous thoracic aorta. Moderate aortic atherosclerosis. Mild fusiform 

dilatation of the proximal descending thoracic aorta measuring 3.2 cm in 

diameter. Aortic root calcifications are present. 

Lymph nodes: No adenopathy by CT size criteria. 



Liver: The liver is incompletely imaged. Multiple hypoattenuating hepatic 

lesions are redemonstrated, possibly cysts. These are not further characterized 

in the absence of intravenous contrast. 

Kidneys and ureters: The kidneys are incompletely imaged. Bilateral 

hydronephrosis is redemonstrated. Incompletely imaged right nephrolithiasis. 

Left kidney upper pole homogeneous hypoattenuating lesions consistent with 

simple-appearing cysts are redemonstrated. 

Bones/joints: The bones appear diffusely demineralized. Multiple 

subacute-appearing bilateral rib fractures. T8 vertebral body superior endplate 

Schmorl node. No aggressive-appearing osseous lesion. 

Soft tissues: Mild dependent body wall edema. 



IMPRESSION: 

1. Multiple nonspecific pulmonary nodules, the largest measuring 6 mm. Primary 

pulmonary neoplasms or metastases are not excluded. Fleischner Society 

follow-up recommendations do not apply in a patient with a history of known 

malignancy. Follow-up is needed per the patient's medical condition. 

2. Very small bilateral pleural effusions. 

3. Central pulmonary arterial enlargement suggestive of pulmonary hypertension. 

4. Mild fusiform dilatation of the descending thoracic aorta measuring 3.2 cm 

in diameter. 

5. Please see the body of the report for other findings as described. 



Electronically signed by: Adam Silver On 10/16/2020  14:46:14 PM

## 2020-10-16 NOTE — SMCUROLCON
Urology Consultation


General


Date of Consultation


10/16/20


Reason For Consultation


This patient is seen for Acute Anemia, Acute Renal Failure, Gi Bleeding.





History of Present Illness


The patient is a [80]-year-old retired ] with a past medical history 

presenting with a rectal mass, profound anemia, azotemia. I'm asked by Dr. Brody

to see the patient for an abnormal appearing CAT scan showing a dilated bladder.

The patient is been on straight cath with 2 L removed on 2 occasions last night.

His creatinine has gone from 3-5 during this hospital stay. He reports that he 

has been voiding and is not felt that his bladder is been particularly full. 

Pathology is pending from a rectal mass which was biopsied yesterday by GI. 

Cancer suspected. CT scan is reviewed showing a massively distended bladder with

hydroureteronephrosis bilaterally. The patient denies prior urologic history or 

procedures. Urine culture has grown a fairly sensitive enterococcus organism. .





Past Medical History


Medical History


Hypertension. DVT. Chronic renal insufficiency.


Surgical Hstory


Colonoscopy with biopsy.





Family History


Significant Family History:  No pertinent family hx





Social History


* Smoker:  non-smoker


Alcohol:  Denies


Drugs:  denies





Medications


Current Medications





Current Medications








 Medications


  (Trade)  Dose


 Ordered  Sig/Ion


 Route


 PRN Reason  Start Time


 Stop Time Status Last Admin


Dose Admin


 


 Home Med


  (Med Rec


 Complete!)    ASDIRECTED


 XX


   10/13/20 16:45


 10/13/20 16:44 DC  





 


 Pantoprazole


 Sodium


  (Protonix)  40 mg  BID


 IV


   10/14/20 09:00


 10/15/20 18:31 DC 10/15/20 10:12





 


 Sodium Chloride  1,000 ml @ 


 90 mls/hr  Q11H7M


 IV


   10/13/20 15:32


 10/14/20 04:54 DC 10/14/20 00:06





 


 Sodium Chloride  1,000 ml @ 


 90 mls/hr  Q11H7M


 IV


   10/13/20 15:45


 10/14/20 04:49 DC  





 


 Sodium Chloride  1,000 ml @ 


 90 mls/hr  Q11H7M


 IV


   10/14/20 05:00


    10/15/20 20:18














Allergies


Allergies:  


Coded Allergies:  


     sulfamethoxazole (Verified  Allergy, Unknown, 10/13/20)





Review of Systems


General:  Reports: Fatigue, Malaise


Constitutional:  Reports: Weakness, Malaise; 


   Denies: Fever, Chills


Eyes:  Denies: Pain, Vision change


ENT:  Denies: Head Aches, Ear Pain


Skin:  Denies: Rash, Lesions


Pulmonary:  Denies: Dyspnea, Cough


Cardiovascular:  Denies Chest Pain, Denies Palpitations; Reports Lt Headedness


Gastrointestinal:  Reports: Abdominal Pain, Constipation, Hematochezia; 


   Denies: Nausea, Vomiting


Genitourinary:  Reports: Frequency, Incontinence; 


   Denies: Dysuria, Hematuria


Hematologic:  Denies: Bruising


Endocrine:  Denies: Polydipsia


Neurological:  Reports: Weakness; 


   Denies: Change in Speech, Confusion


Psych:  Reports: Mood Normal





Physical Examination


General Exam:  Alert, Cooperative, No Acute Distress


EYE EXAM:  PERRLA, Conjunctiva & lids normal, EOMI


ENT EXAM:  Atraumatic, Mucous membr. moist/pink, Nares Patent, Pinna Normal


Neck Exam:  Supple


Chest Exam:  Clear to auscultation, Normal air movement


Heart Exam:  Rate Normal, Regular Rhythm


Abdomen Exam:  Normal Bowel Sounds, Soft, Mass (consistent with bladder)


Male  Exam:  Normal Genital Exam; 


   No: Discharge, Hernia


Male  Exam


Rectal exam markedly abnormal rectal mass. Prostate is difficult to palpate. No 

active bleeding.


Skin Exam:  Nl turgor and temperature; 


   No: Rash


Neuro Exam:  Normal Speech, Cranial Nerves 3-12 NL


Psych Exam:  Mental status NL, Mood NL





Vital Signs/I&O





Vital Signs








  Date Time  Temp Pulse Resp B/P (MAP) Pulse Ox O2 Delivery O2 Flow Rate FiO2


 


10/16/20 06:00 97.3 78 18 145/75 (98) 98 Room Air  











l


I&O- Last 24 Hours up to 6 AM 


 


 10/16/20





 05:59


 


Intake Total 1105 ml


 


Output Total 2175 ml


 


Balance -1070 ml











Laboratory Data


24H Labs


Laboratory Tests 2


10/16/20 07:48: 


Nucleated Red Blood Cells % (auto) 0.0, Anion Gap 11, Glomerular Filtration Rate

11.9L, Calcium Level 7.8L


CBC/BMP


Laboratory Tests


10/16/20 00:10








10/16/20 07:48








Microbiology





Microbiology


10/14/20 Urine Culture - Final, Complete


           Enterococcus Faecalis





Assessment


Impression: Rectal cancer. Urinary retention with post renal azotemia.





Plan: Doss catheter placement. Observe for postobstructive diuresis. We will 

eventually need endoscopic evaluation. He will likely need a period of time of 

bladder decompression to regain bladder tone. I will discuss the patient with 

Dr. Robert, who will be following him up going forward.


Thank you for this consult.











DADA NASH MD              Oct 16, 2020 09:32

## 2020-10-17 NOTE — IPNPDOC
Text Note


Date of Service


The patient was seen on 10/17/20.





NOTE


Subjective:


Patient seen and examined at bedside. No acute overnight events reported. No new

medical complaints. Still have rectal bleed.





Objective:


VITAL SIGNS: See below


General: NAD, lying comfortably in bed, in good spirits


HEENT: NC/AT, EOMI, poor dentition


Lungs: CTA B/L


Heart: +S1S2, RRR


Abd: soft, NT, +BS


Ext: no edema





ASSESSMENT: 81 yo male sent from his PCP for symptomatic anemia, with PMHx of 

unprovoked RLE DVT, HTN. Found to have rectal mass, with obstructive uropathy.





#acute blood loss anemia


- s/p 3 units PRBC - relative stable at this time


- s/p colonoscopy - revealed rectal mass





#rectal mass


- discussed with surgery - suspect will require surgery to remove entire rectum


- oncology c/s pending - spoke with Dr. Sim - assistance appreciated - blank

rgery if continued bleeding, RT/chemo otherwise


- CT chest shows multiple small pulmonary nodules - cannot exclude metastatic 

disease


- CEA WNL





#hypernatremia


- IV fluids - follow as per nephrology - assistance appreciated





#post-obstructive diuresis/urinary retention


- s/p laureano catheter


- follow as per urology - assistance appreciated





#liver lesions


- cysts? metastatic disease


- liver US show simple cysts





#prostate disease


- PSA screen elevated


- urology c/s 





#ELISHA/CKD


#obstructive uropathy


- CT A/P noted from yesterday


- much improved today


- laureano catheter place - follow as per urology





#UTI


- continue antibiotics





#HTN


- holding home medications - lisinopril, HCTZ


- if needed will consider amlodipine





#DVT prophylaxis


- mechanical





VS,Fishbone, I+O


VS, Fishbone, I+O


Laboratory Tests


10/17/20 05:47








10/17/20 05:48











Vital Signs








  Date Time  Temp Pulse Resp B/P (MAP) Pulse Ox O2 Delivery O2 Flow Rate FiO2


 


10/17/20 06:00 98.2 78 18 153/76 (101) 97 Room Air  














I&O- Last 24 Hours up to 6 AM 


 


 10/17/20





 06:00


 


Intake Total 540 ml


 


Output Total 7200 ml


 


Balance -6660 ml

















ISHA CAMEJO MD              Oct 17, 2020 09:27

## 2020-10-17 NOTE — IPNPDOC
Subjective


Date Seen


The patient was seen on 10/17/20.





Subjective


Chief Complaint/HPI


Urinary retention


General:  Reports: Normal Appetite; 


   Denies: Chills, Night Sweats, Fatigue, Malaise


Constitutional:  Denies: Chills, Fever, Night Sweats


Eyes:  Denies: Pain, Vision change


ENT:  Denies: Head Aches, Ear Pain, Dysphagia


Skin:  Denies: Rash, Lesions, Breakdown


Pulmonary:  Denies: Dyspnea, Cough


Cardiovascular:  Denies: Chest Pain, Palpitations, Orthopnea, Paroxysmal Noc. 

Dyspnea, Lt Headedness


Gastrointestinal:  Denies: Nausea, Vomiting, Abdominal Pain, Diarrhea, 

Constipation


Genitourinary:  Denies: Dysuria, Frequency, Incontinence, Retention


Hematologic:  Denies: Bruising, Bleeding Excessively


Musculoskeletal:  Denies: Neck Pain, Back Pain, Joint Pain, Muscle Pain, Spasms


Neurological:  Denies: Weakness, Numbness, Change in speech, Confusion


Psych:  Reports: Mood Normal; 


   Denies: Depression, Memory Issues





Objective


Physical Examination


ENT Exam:  Positive: Atraumatic, Mucous membr. moist/pink, Nares Patent, Pinna 

Normal


Abdomen Exam:  Positive: Normal bowel sounds, Soft; 


   Negative: Tenderness, Hepatospenomegaly


Male  Exam:  Positive: Normal Genital Exam





Assessment /Plan


Assessment


Patient is tolerating the laureano well and urine output yesterday was 5400 cc. Pt 

is keeping up with losses.





Plan/VTE


VTE Prophylaxis Ordered?:  No





Plan


Plan to continue laureano cath for now and monitor urine output for post 

obstructive diuresis.


May need further urologic evaluation later.


Could be done as outpatient.





VS, I&O, 24H, Fishbone


Vital Signs/I&O





Vital Signs








  Date Time  Temp Pulse Resp B/P (MAP) Pulse Ox O2 Delivery O2 Flow Rate FiO2


 


10/17/20 06:00 98.2 78 18 153/76 (101) 97 Room Air  














I&O- Last 24 Hours up to 6 AM 


 


 10/17/20





 06:00


 


Intake Total 540 ml


 


Output Total 7200 ml


 


Balance -6660 ml











Laboratory Data


24H LABS


Laboratory Tests 2


10/17/20 05:47: 


Anion Gap 8, Glomerular Filtration Rate 22.2L, Calcium Level 7.3L


10/17/20 05:48: Nucleated Red Blood Cells % (auto) 0.0


CBC/BMP


Laboratory Tests


10/17/20 05:47








10/17/20 05:48








Microbiology





Microbiology


10/14/20 Urine Culture - Final, Complete


           Enterococcus Faecalis











TATY FITZGERALD MD             Oct 17, 2020 13:48

## 2020-10-18 NOTE — IPNPDOC
Text Note


Date of Service


The patient was seen on 10/18/20.





NOTE


Subjective:


Patient seen and examined at bedside. No acute overnight events reported. No new

medical complaints. Still have rectal bleeding, but states possibly improved.





Objective:


VITAL SIGNS: See below


General: NAD, lying comfortably in bed, in good spirits


HEENT: NC/AT, EOMI, poor dentition


Lungs: CTA B/L


Heart: +S1S2, RRR


Abd: soft, NT, +BS


Ext: no edema





ASSESSMENT: 81 yo male sent from his PCP for symptomatic anemia, with PMHx of 

unprovoked RLE DVT, HTN. Found to have rectal mass, with obstructive uropathy.





#acute blood loss anemia


- s/p 3 units PRBC - relative stable at this time


- s/p colonoscopy - revealed rectal mass - likely malignant





#rectal mass


- discussed with surgery - suspect will require surgery to remove entire rectum


- oncology c/s pending - spoke with Dr. Sim - assistance appreciated - 

surgery if continued bleeding, RT/chemo otherwise


- CT chest shows multiple small pulmonary nodules - cannot exclude metastatic 

disease


- CEA WNL





#hypernatremia


- resolved





#post-obstructive diuresis/urinary retention


- s/p laureano catheter


- follow as per urology - assistance appreciated





#liver lesions


- cysts? metastatic disease


- liver US show simple cysts





#prostate disease


- PSA screen elevated


- urology c/s 





#ELISHA/CKD


#obstructive uropathy


- CT A/P noted from yesterday


- much improved today


- laureano catheter place - follow as per urology





#UTI


- continue antibiotics





#HTN


- holding home medications - lisinopril, HCTZ


- if needed will consider amlodipine





#DVT prophylaxis


- mechanical





Dispo: pending surgery follow up, oncology evaluation





VS,Fishbone, I+O


VS, Fishbone, I+O


Laboratory Tests


10/17/20 14:02








10/18/20 06:01











Vital Signs








  Date Time  Temp Pulse Resp B/P (MAP) Pulse Ox O2 Delivery O2 Flow Rate FiO2


 


10/18/20 06:00 98.3 72 18 154/87 (109) 97 Room Air  














I&O- Last 24 Hours up to 6 AM 


 


 10/18/20





 06:00


 


Intake Total 4680 ml


 


Output Total 5200 ml


 


Balance -520 ml

















ISHA CAMEJO MD              Oct 18, 2020 10:56

## 2020-10-19 NOTE — IPN
DATE:  10/17/2020



SUBJECTIVE:  The patient was seen and examined at the bedside this morning. The 
patient is afebrile, hemodynamically stable. He reports blood in the stools 
today. He is having postobstructive diuresis after placement of a Doss 
catheter. Renal function is improving. Creatinine has improved from 5 to 2.9 
today. He continues to be on IV fluid hydration. He denies any other active 
complaints at this time. 



OBJECTIVE:  

VITAL SIGNS: Temperature is 98.2 degrees Fahrenheit, blood pressure 153/76, 
pulse is 78, respiratory rate of 18, saturating 97% on room air. 



Intake and output: Urine output recorded as 5.4 liters yesterday, 3.7 liters so 
far today since overnight. Weight in the basket is not available.



PHYSICAL EXAMINATION:  

GENERAL APPEARANCE: The patient is awake, alert, oriented x3, laying in bed, in 
no apparent distress. 

HEAD AND NECK: The extraocular muscles are intact. Pupils are equally round and 
reactive to light. Mucous membranes are moist. Neck is supple. There is no JVD. 


CARDIOVASCULAR: S1, S2 regular rate. 

EXTREMITIES: No edema of the bilateral lower extremities. 

RESPIRATORY: Chest is clear to auscultation bilaterally. Mildly decreased breath
sounds at the bases. Otherwise no active rales or rhonchi. 

ABDOMEN: Soft, positive bowel sounds, nontender. No organomegaly.

GENITOURINARY:  He has an indwelling Doss catheter. Urine in the bag is free or
any hematuria.

MUSCULOSKELETAL:  No clubbing or cyanosis. Pulses are 2+.

CNS: No focal deficits. Strength is 5/5 in all extremities. 



LABORATORY STUDIES: CBC showed a WBC of 7, hemoglobin is 8.2, platelets of 163. 



BMP showed sodium 147, potassium 3.9, chloride 116, bicarbonate 23, BUN 45, 
creatinine is 2.9. It was 5 yesterday. Glucose 104. Calcium of 7.3.   



IMAGING: A liver ultrasound was done yesterday which showed moderate right sided
hydronephrosis, scattered simple hepatic cysts.



A CT of the chest was done which showed multiple nonspecific pulmonary nodules, 
the largest measuring 6 mm. Primary pulmonary neoplasms, metastasis are not 
excluded. Very small bilateral pleural effusions. 



CURRENT INPATIENT MEDICATIONS:  The patients medications were all reviewed by 
myself. He was getting IV normal saline at 90 mL at hour which I have stopped 
now. I have started the patient on KCL 20 mEq and D5W at 150 mL an hour. He is 
also getting Ciprofloxacin 200 mg IV q. 12 hourly. 



ASSESSMENT AND PLAN:  

1.  Acute renal failure  it was secondary to obstructive uropathy. The patient 
had a Doss catheter placed. Renal function is improving. He is having 
postobstructive diuresis. IV fluids have been changed to D5W only.

2.  Hypernatremia  it is secondary to replacement of urine output with normal 
saline and postobstructive diuresis as mentioned above. IV fluids have been 
changed to D5W with KCL. Sodium level is improving.

3.  Metabolic acidosis  it is secondary to acute renal failure. Bicarbonate 
level is improving with the improvement of the renal function.

4.  Enterococcus faecalis UTI  it is sensitive to Ciprofloxacin. The patient is 
getting Ciprofloxacin at this time. 

5.  Anemia secondary to GI bleed - The patient is status post 3 units PRBC 
transfusion. Hemoglobin level is stable.

6.  Rectal mass  Pathology report is pending. The patient will need further CAT 
scans for possible staging of the tumor. Once the renal function improves, the 
patient can get the contrast studies.  

MTDD

## 2020-10-19 NOTE — IPNPDOC
Date Seen


The patient was seen on 10/19/20.





Progress Note


was called to see the patient with a rectal mass


came to the hospital with anemia and acute bleeding episode


i suggested to follow the path report and to see dr Petersen as outpatient


the treatment plan will be chemo-rt followed by resection and chemotherapy


urology follow up as the patient has an enlarged prostate and elevated PSA





VS, I&O, 24H, Fishbone


Vital Signs/I&O





Vital Signs








  Date Time  Temp Pulse Resp B/P (MAP) Pulse Ox O2 Delivery O2 Flow Rate FiO2


 


10/19/20 06:00 100.0 83 16 131/73 (92) 96 Room Air  














I&O- Last 24 Hours up to 6 AM 


 


 10/19/20





 06:00


 


Intake Total 2370 ml


 


Output Total 4550 ml


 


Balance -2180 ml











Laboratory Data


24H LABS


Laboratory Tests 2


10/19/20 05:47: 


Nucleated Red Blood Cells % (auto) 0.0, Anion Gap 8, Glomerular Filtration Rate 

42.4, Calcium Level 7.3L


CBC/BMP


Laboratory Tests


10/19/20 05:47








Microbiology





Microbiology


10/14/20 Urine Culture - Final, Complete


           Enterococcus Faecalis











SUNITHA FOSTER MD         Oct 19, 2020 11:34

## 2020-10-19 NOTE — IPN
DATE:  10/16/2020



HISTORY OF PRESENT ILLNESS: The patient was seen in consultation on the 13th of October after being admitted by the Hospitalist with marked anemia and evidence 
for some rectal bleeding. He underwent a colonoscopy yesterday with upper 
endoscopy. There was nothing of great significance on his upper endoscopy. His 
colonoscopy however revealed a large circumferential ulcerated mass in the mid 
and distal rectum. Biopsies were obtained but the appearance is certainly 
consistent with a large rectal cancer. The patient had a CT scan ordered by the 
Hospitalist which was done last evening. My review of this showed a markedly 
distended bladder with evidence of hydroureteronephrosis. The prostate was 
obviously quite enlarged. There was some loss of tissue planes around a portion 
of the rectum in the area where his rectal cancer was felt to be located. There 
were a few more lucent areas in the liver which certainly could represent cysts,
although I would be a little concerned that these could represent areas of 
metastasis. 



Vital signs show that he has been afebrile since yesterday. His pulse has been 
generally in the 70s with an acceptable blood pressure and a normal room air 
saturation. 



Intake and output show that yesterday he had 1645 in with 2175 out. He did have 
a catheter placed with return of a large amount of urine last evening, and he 
now has and indwelling Doss. The patient denies any pain currently. He has not 
had any bleeding by his report today. 



PHYSICAL EXAMINATION:  The abdomen is soft and nontender without appreciable 
mass. His Doss catheter is draining some very clear urine.



LABORATORY STUDIES: This morning white count of 8, hemoglobin 9, hematocrit 27 
and a platelet count of 160,000.



Chemistry profile shows sodium 145, potassium 4.7, chloride 114, CO2 of 20, BUN 
63, creatinine 5.0 and a glucose of 124. He had a PSA of 15.7 with a CEA of 1.6.




IMAGING: His CT results were as outlined in the history of the present illness. 



IMPRESSION:  Patient has a large tumor in the rectum. His prostate is markedly 
enlarged with bladder outlet obstruction leading to bilateral 
hydroureteronephrosis. I suspect this is a significant factor in his worsened 
renal function. He now has a Doss catheter in place and has been seen by 
Urology. 



PLAN:  I spoke with Dr. Villarreal today about the current plan. I believe the 
patient should be evaluated by Urology to address his bladder outlet obstruction
to preserve as much renal function as possible. It is certainly possible that 
this represents prostate cancer which could require its own treatment. I am 
awaiting the results of the biopsy of the rectal mass, but suspect this is 
rectal cancer. I believe given the size and location this would likely require 
an abdominoperineal resection for removal, and he would have a permanent ostomy.
The liver abnormalities, I think, should be evaluated further with an ultrasound
to confirm that these represent benign cysts and not evidence of metastatic 
disease. I will order a CEA as further evaluation. I discussed the patient's 
known diagnoses at this point with him. At this point he is not inclined to have
surgery, but will consider this over the weekend. We will see what the biopsy 
brings.

ALYSON

## 2020-10-19 NOTE — IPNPDOC
Text Note


Date of Service


The patient was seen on 10/19/20.





NOTE


Subjective:


Patient seen and examined at bedside. No acute overnight events reported. No new

medical complaints. Still have rectal bleeding, but states possibly improved.





Objective:


VITAL SIGNS: See below


General: NAD, lying comfortably in bed, in good spirits


HEENT: NC/AT, EOMI, poor dentition


Lungs: CTA B/L


Heart: +S1S2, RRR


Abd: soft, NT, +BS


Ext: no edema





ASSESSMENT: 81 yo male sent from his PCP for symptomatic anemia, with PMHx of 

unprovoked RLE DVT, HTN. Found to have rectal mass, with obstructive uropathy.





#acute blood loss anemia


- s/p 3 units PRBC - relative stable at this time


- s/p colonoscopy - revealed rectal mass - likely malignant


- not clear if still bleeding





#rectal mass


- discussed with surgery - suspect will require surgery to remove entire rectum


- oncology c/s pending - spoke with Dr. Sim - assistance appreciated - 

surgery if continued bleeding, RT/chemo otherwise


- CT chest shows multiple small pulmonary nodules - cannot exclude metastatic 

disease


- CEA WNL





#hypernatremia


- resolved





#post-obstructive diuresis/urinary retention


- s/p laureano catheter


- follow as per urology - assistance appreciated





#liver lesions


- cysts? metastatic disease


- liver US show simple cysts





#prostate disease


- PSA screen elevated


- urology c/s 





#ELISHA/CKD


#obstructive uropathy


- CT A/P noted from yesterday


- much improved today


- laureano catheter place - follow as per urology





#UTI


- continue antibiotics





#HTN


- holding home medications - lisinopril, HCTZ


- if needed will consider amlodipine





#DVT prophylaxis


- mechanical





Dispo: pending surgery follow up, oncology c/s, urology f/u





VS,Fishbone, I+O


VS, Fishbone, I+O


Laboratory Tests


10/19/20 05:47











Vital Signs








  Date Time  Temp Pulse Resp B/P (MAP) Pulse Ox O2 Delivery O2 Flow Rate FiO2


 


10/19/20 06:00 100.0 83 16 131/73 (92) 96 Room Air  














I&O- Last 24 Hours up to 6 AM 


 


 10/19/20





 06:00


 


Intake Total 2370 ml


 


Output Total 4550 ml


 


Balance -2180 ml

















ISHA CAMEJO MD              Oct 19, 2020 09:41

## 2020-10-19 NOTE — IPN
DATE:  10/18/2020



SUBJECTIVE:  The patient was seen and examined at the bedside today morning.  He
is afebrile, hemodynamically stable.  He has very good urine output.  Renal 
function continues to improve.  He is drinking a good amount of fluid as well 
and he has finished 3 liters of IV fluids that were ordered yesterday  



OBJECTIVE:  Vital signs:  Temperature is 98.3 degrees Fahrenheit, blood pressure
154/87, pulse is 72, respiratory rate of 18, saturating 97% on room air.  Intake
and output:  Urine output recorded as 5.8 liters yesterday and 3.7 liters so far
today since overnight. Weight in the bed scale is not available.  



PHYSICAL EXAMINATION:  The patient is awake, alert, oriented x3, laying in bed, 
in no apparent distress.  Head and neck examination:  Extraocular muscles are 
intact.  Pupils equally round and reactive to light. Mucous membranes are moist.
 Neck is supple.  There is no jugular venous distention (JVD).  Cardiovascular: 
S1, S2,  regular rate.  No edema of the bilateral lower extremities.  
Respiratory:  Chest is clear to auscultation bilaterally.  Bilateral equal air 
entry.  No rales or rhonchi.  Abdomen:  Soft.  Positive bowel sounds.  
Nontender.  Genitourinary:  Bladder is nonpalpable.  He has an indwelling Doss 
catheter.  Musculoskeletal:  No clubbing or cyanosis.  Pulses are 2+.  CNS:  No 
focal deficits.  Power is 5/5 in all extremities.  



LABORATORY REVIEW:  Complete blood count (CBC) showed a WBC of 6.9, hematocrit 
8.4, platelets are 171.  Basic metabolic panel (BMP) shows sodium 137, potassium
3.9, chloride 109, bicarbonate 22, BUN 28, creatinine is 1.8, it was 2.5 
yesterday.  Calcium is 7.7.  



CURRENT INPATIENT MEDICATIONS:  The patient's medications have been all reviewed
by myself.  He has finished the D5W with KCl today.  I have changed his IV 
fluids to KCl 20 mEq and half normal saline at 100 ml per hour for a total of 2 
liters. 



ASSESSMENT:  

1.  Acute renal failure.  The patient is having post obstructive diuresis now.  
Renal failure is improving.  Creatinine has improved to 1.8.  Continue the IV 
fluids at this time.  

2.  Anemia secondary to gastrointestinal blood loss. The patient's hemoglobin is
slowly improving.  He has received 3 units of packed red blood cells (PRBC) 
transfusion during this hospitalization.  Transfuse as needed for hemoglobin 
below 8 only.  

3.  Enterococcus faecalis urinary tract infection (UTI). Continue current dose 
of ciprofloxacin.  

4.  Rectal mass.  The patient is undergoing workup.  Surgical service and 
hematology/oncology have already been consulted.  The rest of the management is 
per the medical team 

5.  History of hypertension. The patient's blood pressures are slightly 
elevated.  However, because of acute renal failure, his antihypertensive regimen
is on hold.  If needed, the patient will be started on calcium channel blockers.
 

MTDD

## 2020-10-20 NOTE — IPNPDOC
Text Note


Date of Service


The patient was seen on 10/20/20.





NOTE


CC: Rectal Mass Bleeding 





Subjective: Gustavo Hanna is an 80 yr. old male who presented with rectal mass

bleeding. Today, patient complains of lightheadedness. He states that this has 

occurred while both lying down and by doing activity, but is more aggravated 

when doing activity. He complains that he has double vision. He states that he 

has been eating and keeping hydrated well. Does not have any nausea or vomiting 

associated with the lightheadedness.





ROS:


General: Denies fever


HEENT: Positive for vision loss, lightheadedness, denies headaches


Cardiovascular: Denies chest pain, leg swelling, palpitations


Respiratory: Denies shortness of breath, coughing, wheezing


GI: Denies abdominal pain, diarrhea, constipation, nausea, vomiting





Objective:


General: Patient in no acute distress, alert and oriented


Cardiovascular: RRR, no murmurs or gallops, normal S1 and S2


Respiratory: Lungs clear to auscultation, no wheezing or rales


GI: Normal bowel sounds in all four quadrants, nontender, nondistended





Assessment: Gustavo Hanna is an 80 yr. old male who presented with rectal mass

bleeding. Patient's lightheadedness concerning for possible symptomatic anemia. 





Plan: 


1. Acute blood loss anemia: Patient's current lightheadedness possibly 

symptomatic anemia - however Hg has remained stable, likely 2/2 deconditioning 


2. Rectal mass bleeding: Dr. Timmons spoke to Dr. Sim recently, who, 

according to his note from 10/19/20, recommended chemo-radiation followed by 

resection and chemotherapy, also suggested to follow the path report.


3. Hypernatremia: Resolved and stable with most recent lab results showing a 

sodium of 141 


4. Post-obstructive diuresis/urinary retention: s/p laureano catheter, follow as 

per urology, assistance appreciated


5. Liver lesions - liver US shows simple cysts


6. Prostate disease - As per Dr. Sim's recent note, patient will follow-up 

with urology due to enlarged prostate and elevated PSA


7. ELISHA/CKD - obstructive uropathy, CT A/P noted, Laureano catheter is set for home 

use


8. UTI: Continue antibiotics


9. Hypertension: Holding medications (lisinopril, HCTZ), will consider 

amlodipine if needed


10. DVT prophy: mechanical





Dispo: low hemoglobin levels causing symptomatic problems. Also pending PT and 

OT clearance.





VS,Fishbone, I+O


VS, Fishbone, I+O


Laboratory Tests


10/20/20 05:56











Vital Signs








  Date Time  Temp Pulse Resp B/P (MAP) Pulse Ox O2 Delivery O2 Flow Rate FiO2


 


10/20/20 06:00 97.8 70 18 136/67 (90) 95 Room Air  














I&O- Last 24 Hours up to 6 AM 


 


 10/20/20





 06:00


 


Intake Total 2160 ml


 


Output Total 3677 ml


 


Balance -1517 ml











GME ATTESTATION


GME ATTESTATION


My faculty preceptor for this patient encounter was physically present during 

the encounter and was fully available. All aspects of the patient interview, 

examination, medical decision making process, and medical care plan development 

were reviewed and approved by the faculty preceptor. The faculty preceptor is 

aware and concurs with the plan as stated in the body of this note and will 

attest to such by his/her cosignature.





ATTENDING NOTE


I, Katlyn Magana, have independently examined this patient and performed my own 

physical exam, as well as reviewed the documentation and edited where necessary.

I have discussed in detail with the resident / student the findings and plan of 

treatment as documented by the resident / student and edited their note. I agree

with their findings and treatment plan and have edited their documentation. I 

will continue to follow the patient during this hospital stay.





Disposition:


- Transition to [facility] status


- Awaiting PT clearance











NUHA TAVERA OMS-IV           Oct 20, 2020 11:55


KATLYN MAGANA MD                Oct 20, 2020 15:07

## 2020-10-20 NOTE — IPN
DATE:  10/19/2020



SUBJECTIVE:  The patient was seen and examined at the bedside today morning. He 
is afebrile, hemodynamically stable. Renal function continues to improve. IV 
fluids have been stopped now. He is adequately hydrating himself orally. He 
denies any active complaints at this time. 



PHYSICAL EXAMINATION:  

VITAL SIGNS: Temperature is 100 degrees Fahrenheit, blood pressure is 131/73, 
pulse is 83, respiratory rate of 16, saturating 96% on room air. Intake and 
output: Urine output recorded as 4.8 liters yesterday, 2.4 liters so far today 
since overnight. Weight on the bed scale is not available. 

GENERAL: Patient is awake, alert and oriented x3, lying in bed in no apparent 
distress. 

HEAD AND NECK: Extraocular muscles intact. Pupils equally round and reactive to 
light. Mucous membranes are moist. Neck is supple. There is no JVD. 

CARDIOVASCULAR: S1, S2, regular rate. No edema of the bilateral lower 
extremities. 

RESPIRATORY: Chest is clear to auscultation bilaterally. Bilateral equal air 
entry. No rales or rhonchi.

ABDOMEN: Soft,  positive bowel sounds.

GENITOURINARY: He has an indwelling Doss catheter. 

MUSCULOSKELETAL: No clubbing or cyanosis. Pulses are 2+.

CNS: No focal deficits. Power is 5/5 in all extremities. 



LABORATORY REVIEW:  CBC showed a WBC of 7.3, hemoglobin is 9, platelets are 
199,000. BMP showed a sodium of 138, potassium 3.7, chloride 109, bicarbonate 
21, BUN 25, creatinine is 1.6, it was 1.8 yesterday. 



CURRENT INPATIENT MEDICATIONS:  The patients medications were all reviewed by 
myself. IV fluids have finished now. He continues to be on IV Ciprofloxacin. 



ASSESSMENT AND PLAN: 

1.  Acute renal failure, it was secondary to obstructive uropathy. Patient has a
postobstructive diuresis which is improving now. Continue to encourage oral 
hydration. IV fluids have been stopped now.

2.  Enterococcus faecalis UTI. Patient is currently getting Ciprofloxacin. One 
week of antibiotics should be enough.

3.  Rectal mass, final pathology result is pending. Patient is already being 
seen by surgery and hematology/oncology. Hemoglobin level is stable. 

4.  Disposition: The patients renal function is improving, electrolyte and acid
base status is within the acceptable range. Nephrology service is going to sign 
off. Patient needs to follow-up with Nephrology within two weeks after discharge
from the hospital. 

ALYSON

## 2020-10-21 NOTE — IPN
DATE:  10/20/2020



HISTORY OF PRESENT ILLNESS:  Patient was found on endoscopy last week to have a 
large ulcerated rectal mass. Biopsies were pending when I last saw him on 
Friday, 10/16/2020. There was also a question of some hypodense lesions in the 
liver and I was concerned these could represent metastatic deposits. His 
pathology report has now returned, showing well to moderately differentiated 
adenocarcinoma. His liver ultrasound was done also on the 16th, fairly late in 
the day, and this showed only some benign appearing cysts with no evidence of 
metastatic disease. 



VITAL SIGNS: Patient has been afebrile over the past 24 hours. His pulse is in 
the 70s to 90s, and his blood pressure is good. 



INTAKE AND OUTPUT: Shows that he has been taking a diet well. The hospitalist 
has kept him on some I.V. fluid. He has been receiving Ciprofloxacin recently 
for positive urine culture for Enterococcus faecalis. 



PHYSICAL EXAMINATION:  Patient is lying quietly on the hospital bed. He is not 
in any obvious discomfort. He has a Doss catheter in place, draining clear 
urine. Abdomen is soft and nontender. 



LABORATORY STUDIES TODAY:  White count 7, hemoglobin 9, hematocrit 28, platelet 
count 204,000. Chemistry profile shows sodium 141, potassium 3.8, chloride 111, 
CO2 22, BUN 26, creatinine 1.6, glucose 91. His creatinine improved markedly 
following placement of his urinary catheter and is now back down to what was his
baseline a year or two ago. 



IMPRESSION: 

1.   Large ulcerated low lying rectal cancer with bleeding.

2.   Bladder outlet obstruction secondary to markedly enlarged prostate with 
secondary acute renal failure.

3.   Multiple small, too small to characterize, pulmonary nodules.



PLAN:  The patient's daughter was present today and she had many questions, and 
the patient and his daughter and I discussed at some length his various severe 
medical issues. I outlined for her that he has a large rectal cancer, that the 
usual treatment would involve a surgical resection preceded by some 
chemo/radiation to shrink the tumor. I advised her that this would require an 
abdominoperineal resection and a permanent colostomy, and she had some questions
about this. I also advised her that there is the issue of his enlarged prostate 
with the bladder outlet obstruction and secondary hydronephrosis and renal 
failure. This can be managed temporarily with a urinary catheter, but a longer 
term solution likely would require urologic surgery. I advised them that his 
prostate enlargement could be just benign prostatic hyperplasia, but could also 
represent prostate cancer and this answer is not yet known. I advised them that 
the areas in the liver that had been of concern appear to be only benign cysts 
and there is no other definite evidence for metastatic disease from his rectal 
cancer. There are several small nodules, I think it is 5 or 6 on his recent CAT 
scan, the largest of which is approximately 5 mm and these are all too small to 
characterize and are likely benign.



The daughter expressed significant reservations about her fathers ability to 
tolerate the major surgery that would be required for his rectal cancer. She is 
also concerned that as she lives in [place] she will not be able to be as 
involved in his care as she would like to be. She asked about him possibly 
receiving care in [place] and I advised her that he can certainly seek care 
wherever it is most convenient and suitable for him. She had some questions 
about what services might be available for her father locally and I advised her 
to discuss this with the representative from the patient and family services 
department. The patient and his daughter were thankful that I had the 
opportunity to spend some time speaking with them about these issues and clearly
I think they both understand the situation better. 

ALYSON

## 2020-10-27 NOTE — DS.PDOC
Discharge Summary


General


Date of Admission


Oct 13, 2020 at 15:45


Date of Discharge


10/27/2020





Discharge Summary


PROCEDURES PERFORMED DURING STAY: 


Colonoscopy 10/5/2020 with Dr. Hay 





ADMITTING DIAGNOSES / DISCHARGE DIAGNOSES:


s/p Acute blood loss anemia


Rectal mass bleeding


s/p Hypernatremia


Post-obstructive diuresis/urinary retention


Liver lesions


Prostate disease


s/p ELISHA on CKD - likely 2/2 post-renal etiology


s/p UTI


Hypertension


DVT prophylaxis





COMPLICATIONS/CHIEF COMPLAINT: 


Dizziness





HISTORY OF PRESENT ILLNESS: 


Patient is a 80-year-old  male with a PMHx of HTN, CKD3, Hx of 

Unprovoked RLE DVT (Dx 2013, on Xarelto, Discontinued 2019), who presented to 

the ER at the direction of his PCP for routine blood work that showed a 

hemoglobin of 6.1. Patient was made to hospital service for further evaluation 

and treatment. Nephrology and general surgery were called on consultation for 

likely colonoscopy.





HOSPITAL COURSE: 


s/p Acute blood loss anemia - 2/2 lower GI bleeding - 2/2 rectal mass


- Patient's Humulin has remained stable


- s/p 3 units transfusion; no further bleeding noted 


- Orthostatic vitals negative


- Cleared PT and OT for home with services





Rectal mass - 2/2 adenocarcinoma 


- Colonoscopy 10/15 was found to have rectal adenocarcinoma on biopsy


- Gen. surgery and oncology were called on consultation


- Patient has been advised to follow-up with oncology and general surgery within

the next 7 days; patient has verbalized understanding


- He is not sure at this point if he would like to pursue surgery or have 

chemotherapy alone; will continue discussion with family


- Patient has verbalized he will remain compliant with follow up to have his 

rectal cancer addressed 





s/p Hypernatremia





Post-obstructive diuresis/urinary retention


- Failed voiding trial


- c/w Doss catheter on discharge


- Urology was called on consultation; will have patient follow-up up within the 

next 7 days





Liver lesions


- Imaging consistent with simple cysts





Prostate disease


- As per Dr. Sim's recent note; patient will follow-up with urology due to 

enlarged prostate and elevated PSA





s/p ELISHA on CKD - likely 2/2 post-renal etiology


- Nephrology on consultation


- c/w Doss catheter





s/p UTI


- Completed antibiotic course





Hypertension


- s/p HCTZ and Lisinopril


- Will provide low dose Amlodipine on discharge





DVT prophylaxis


- c/w TEDs/Sequentials 





DISCHARGE MEDICATIONS: Please see below.


 


ALLERGIES: Please see below.





PHYSICAL EXAMINATION ON DISCHARGE:


VITAL SIGNS: Please see below.


Vitals (See below)


General: Lying in bed, appears to be comfortable, AAOx3


HEENT: NC, AT


CVS: RRR, +S1S2


Lungs: Fair air entry b/l, no visual wheezing, rhonchi or rales


Abdomen: Soft, ND, NT


Extremities: No evidence of edema, - Calf tenderness





LABORATORY DATA: 


Please see below.





ACTIVITY: 


[As tolerated].





DISCHARGE PLAN: 


Follow-up with primary care provider, nephrology, urology, oncology and general 

surgery within the next 7 days


Remain compliant with treatment plan and medications


Return to the ER if you experience any problems


Patient has been advised to remain compliant with quarantine rules by [facility]





DISPOSITION:


Home with services





DISCHARGE CONDITION: 


[Stable].





TIME SPENT ON DISCHARGE: 


35 minutes.





Vital Signs/I&Os





Vital Signs








  Date Time  Temp Pulse Resp B/P (MAP) Pulse Ox O2 Delivery O2 Flow Rate FiO2


 


10/27/20 06:00 98.1 73 17 142/74 (96) 97 Room Air  


 


10/22/20 14:00        97














I&O- Last 24 Hours up to 6 AM 


 


 10/27/20





 06:00


 


Intake Total 850 ml


 


Output Total 1851 ml


 


Balance -1001 ml











Discharge Medications


Scheduled


Amlodipine Besylate (Amlodipine Besylate) 2.5 Mg Tablet, 1 TAB PO DAILY





Scheduled PRN


Acetaminophen (Tylenol Extra Strength) 500 Mg Tablet, 500 MG PO Q6H PRN for 

HEADACHE, (Reported)





Allergies


Coded Allergies:  


     sulfamethoxazole (Verified  Allergy, Unknown, 10/13/20)











MEDHAT ESPINOSA MD                Oct 27, 2020 11:56

## 2020-10-29 NOTE — HPEPDOC
[facility] Medical History & Physical


Date of Admission


Oct 29, 2020


Date of Service:  Oct 29, 2020





History and Physical


CHIEF COMPLAINT: lightheaded





HISTORY OF PRESENT ILLNESS: 


81 yo male DNR/DNI  presented to the ER w c/o lightheadedness x3 at home, and 

history of falls -once on the cement in his garage one month ago,  for SNF 

placement as he is unable to take care of himself. He denies any chest pain, 

pressure, tightness, palpitations, sob, but admits to dizziness without a fall 

this time. His daughter lives in [place] as a teacher with 2 teenagers and a 

, and will be unable to care for him. Despite having a chronic indwelling

laureano, he denies dysuria, fever, chills, and malodorous urine, or flank pain. He

was exposed to a COVID positive person, and is currently on a 14 day isolation 

per the Dept of health. 





He was recently diagnosed with a large low lying rectal mass by Dr. Hay, 

General Surgery, with pathology confirming  rectal cancer. He had acute blood 

loss anemia s/p 2 urbc transfusion during his 10/21/20-10/27/20 hospital 

admission at [facility]. At that time, he was also found to have urine retention, due 

to an enlarged prostate causing bladder outlet obstruction,hydronephrosis 

requiring a laureano catheter, and acute renal failure complicated by enterococcus 

faecalis UTI.  His daughter lives in [place], and has discussed with both 

general surgery Dr. Hay, and Dr. Newton, hematology/oncologist, that she 

has reservations about a major surgery, as it would require the patient to have 

chemo/RT first to reduce the tumor, then abdominoperineal resection with a 

permanent colostomy. He was discharged home 10/27/20 with plans to see Dr. Nicola russo. He presents with c/o lightheadedness. CT head negative. hgb 8.4 

from 8.6 on discharge, and remains at baseline ckd3 with creatinine of 1.7 now 

from 1.6-1.8 baseline. He continues to have occasional bloody rectal discharge, 

but "not much"  and was not orthostatic in the ER. Hospitalist was asked to 

admit for placement.  He agreed to rbc transfusion, but says, "At my age, I am 

not interested in doing chemo/radiation, and a 4 hour surgery. If God decides to

take me, I'm ready to go."  He denies suicidal ideation or homicidal tendency. 

EKG sinus.





PAST MEDICAL HISTORY:


Rectal CA 


Bladder Outlet obstruction


pulmonary nodules


liver lesions


Enlarged prostate


CKD 3


Hydronephrosis s/p laureano


Blood loss anemia s/p 2 units rbc transfusion due to rectal ca 10/21/20


unprovoked DVT - RLE - 2013 not on NOAC due to rectal ca


HTN





ALLERGIES: Please see below.





REVIEW OF SYSTEMS:10 point systems review Negative except as per HPI.





HOME MEDICATIONS: Please see below. 





PHYSICAL EXAMINATION:


VITAL SIGNS: See below


General: NAD, pale, appears his stated age.


HEENT: NC/AT, EOMI,anicteric  poor dentition


Lungs: CTA B/L AEBE 


Heart: +S1S2, RRR


Abd: soft, NT, +BS x 4quadrants leg bag chronic indwelling laureano


Ext: no edema





LABORATORY DATA: See below.





MICROBIOLOGY: Please see below. 





IMAGING: 





CT HEAD WITHOUT CONTRAST 10/29/20


INDICATION:


dizzy.


COMPARISON:


None.


TECHNIQUE:


Helical scanning is acquired. 5 mm axial images were reformatted.  Coronal MPR 

images


were generated.


FINDINGS:


Bone window settings demonstrate intact bony calvarium.  Visualized paranasal 

sinuses


are clear.  There is moderate vascular calcification in the distal internal 

carotid


arteries bilaterally.  No intraorbital abnormality is seen.  On soft tissue wind

ow


settings, there is generalized volume loss.  There is periventricular low 

density in


the right frontal lobe on the right consistent with a lacunar infarction.  This


appears to be old.  Similarly, there is a lacunar infarct in the lateral aspect 

of the


basal ganglia on the left.  No acute infarction is appreciated.  No evidence of


intracranial hemorrhage is seen.  No mass, extra-axial fluid collection, or 

midline


shift.


IMPRESSION:


There is diffuse atrophy and vascular calcification.  There is a lacunar infarct

in


the right periventricular frontal lobe white matter and another in the left ba

sal


ganglia.  Both of these appear to be old.  No acute intracranial abnormality is


appreciated..


<Electronically signed by Onur Oviedo > 10/29/20 7920





ASSESSMENT: 81 yo male brought in by family for SNF placement and c/o 

lightheadedness.





PROBLEMS:


Lightheadedness / Symptomatic anemia due to rectal cancer


chronic blood loss anemia


Rectal CA diagnosed 10/2020


Bladder Outlet obstruction w laureano


pulmonary nodules


liver lesions


Enlarged prostate


CKD 3


Hydronephrosis with chronic indwelling catheter


Blood loss anemia s/p 2 units rbc transfusion due to rectal ca 10/21/20


unprovoked DVT - RLE - 2013 not on NOAC due to rectal ca


HTN


  


PLAN:


Pt will be admitted as an inpatient for two midnights for placement. PFS 

consulted. He was not orthostatic, but will be given


rbc transfusion due to symptomatic anemia. Due to active rectal cancer and 

further risk of bleeding, he will be given compression stockings for DVT 

prophylaxis. 


monitor renal function, avoid nephrotoxins, renally dose medications. check ua w

reflex cx . continue chronic indwelling Laureano for retention. 


Pt refuses further treatment with chemo/RT/surgery for his rectal cancer, but 

not ready for CMO.





Vital Signs





Vital Signs








  Date Time  Temp Pulse Resp B/P (MAP) Pulse Ox O2 Delivery O2 Flow Rate FiO2


 


10/29/20 14:35 98.0 70 18 128/60 (82) 99   


 


10/29/20 13:43      Room Air  











Laboratory Data


Labs 24H


Laboratory Tests 2


10/29/20 11:58: 


Immature Granulocyte % (Auto) 0.3, Neutrophils (%) (Auto) 69.7H, Lymphocytes (%)

(Auto) 15.0L, Monocytes (%) (Auto) 12.0H, Eosinophils (%) (Auto) 2.5, Basophils 

(%) (Auto) 0.5, Neutrophils # (Auto) 5.3, Lymphocytes # (Auto) 1.1L, Monocytes #

(Auto) 0.9H, Eosinophils # (Auto) 0.2, Basophils # (Auto) 0.0, Nucleated Red 

Blood Cells % (auto) 0.0, Prothrombin Time 13.9, Prothromb Time International 

Ratio 1.05, Anion Gap 6L, Glomerular Filtration Rate 40.2, Calcium Level 8.7L, 

Total Bilirubin 0.1L, Direct Bilirubin < 0.1, Aspartate Amino Transf (AST/SGOT) 

19, Alanine Aminotransferase (ALT/SGPT) 10L, Alkaline Phosphatase 70, Total 

Creatine Kinase 86, Creatine Kinase MB 2.4, Creatine Kinase MB Relative Index 

2.79, Troponin I < 0.02, Total Protein 6.5, Albumin 2.5L, Albumin/Globulin Ratio

0.6, Lipase 138


CBC/BMP


Laboratory Tests


10/29/20 11:58











Home Medications


Scheduled


Amlodipine Besylate (Amlodipine Besylate) 2.5 Mg Tablet, 2.5 MG PO DAILY





Scheduled PRN


Acetaminophen (Tylenol Extra Strength) 500 Mg Tablet, 500 MG PO Q6H PRN for 

HEADACHE





Allergies


Coded Allergies:  


     sulfamethoxazole (Verified  Allergy, Unknown, 10/13/20)





A-FIB/CHADSVASC


A-FIB History


Current/History of A-Fib/PAF?:  No


Current PO Anticoag Therapy:  No





Age/Risk Factor Scoring


CHADSVASC:  








CHADSVASC Response (Comments) Value


 


Age Risk Factor Age >/= 75 years old 2


 


Gender Risk Factor Male 0


 


Hx of CHF No 0


 


Hx of HTN Yes 1


 


Hx of Stroke/TIA/or VTE No 0


 


Hx of Diabetes No 0


 


Hx of Vascular Disease No 0


 


Total  3











Treatment


Treatment ordered:  NONE


Reason Anticoagulant not given:  Current bleeding











SPENCER WILSON MD            Oct 29, 2020 15:38

## 2020-10-29 NOTE — REP
INDICATION:

dizzy.



COMPARISON:

None.



TECHNIQUE:

Helical scanning is acquired. 5 mm axial images were reformatted.  Coronal MPR images

were generated.



FINDINGS:

Bone window settings demonstrate intact bony calvarium.  Visualized paranasal sinuses

are clear.  There is moderate vascular calcification in the distal internal carotid

arteries bilaterally.  No intraorbital abnormality is seen.  On soft tissue window

settings, there is generalized volume loss.  There is periventricular low density in

the right frontal lobe on the right consistent with a lacunar infarction.  This

appears to be old.  Similarly, there is a lacunar infarct in the lateral aspect of the

basal ganglia on the left.  No acute infarction is appreciated.  No evidence of

intracranial hemorrhage is seen.  No mass, extra-axial fluid collection, or midline

shift.







IMPRESSION:

There is diffuse atrophy and vascular calcification.  There is a lacunar infarct in

the right periventricular frontal lobe white matter and another in the left basal

ganglia.  Both of these appear to be old.  No acute intracranial abnormality is

appreciated..





<Electronically signed by Onur Oviedo > 10/29/20 2946

## 2020-10-30 NOTE — IPNPDOC
Date Seen


The patient was seen on 10/30/20.





Progress Note


SUBJECTIVE:


weakness improved. no c/o sob, fever, or chills. no c/o n/v/d/abd pain. still w 

gait instability. s/p 2 u rbc with no jennings or signs


of fluid overload





OBJECTIVE:


PHYSICAL EXAMINATION:


VITAL SIGNS: See below


General: NAD, pale, appears his stated age.


HEENT: NC/AT, EOMI,anicteric  poor dentition


Lungs: CTA B/L AEBE 


Heart: +S1S2, RRR


Abd: soft, NT, +BS x 4quadrants leg bag chronic indwelling laureano


Ext: no edema





LABORATORY DATA: See below.





MICROBIOLOGY: Please see below. 





IMAGING: 





CT HEAD WITHOUT CONTRAST 10/29/20


INDICATION:


dizzy.


COMPARISON:


None.


TECHNIQUE:


Helical scanning is acquired. 5 mm axial images were reformatted.  Coronal MPR 

images


were generated.


FINDINGS:


Bone window settings demonstrate intact bony calvarium.  Visualized paranasal 

sinuses


are clear.  There is moderate vascular calcification in the distal internal 

carotid


arteries bilaterally.  No intraorbital abnormality is seen.  On soft tissue 

window


settings, there is generalized volume loss.  There is periventricular low 

density in


the right frontal lobe on the right consistent with a lacunar infarction.  This


appears to be old.  Similarly, there is a lacunar infarct in the lateral aspect 

of the


basal ganglia on the left.  No acute infarction is appreciated.  No evidence of


intracranial hemorrhage is seen.  No mass, extra-axial fluid collection, or 

midline


shift.


IMPRESSION:


There is diffuse atrophy and vascular calcification.  There is a lacunar infarct

in


the right periventricular frontal lobe white matter and another in the left 

basal


ganglia.  Both of these appear to be old.  No acute intracranial abnormality is


appreciated..


<Electronically signed by Onur Oviedo > 10/29/20 [id number]





ASSESSMENT: 81 yo male brought in by family for SNF placement and c/o 

lightheadedness.





PROBLEMS:


 Symptomatic anemia due to rectal cancer, s/p 2 u rbc transfusion 10/29/20


chronic blood loss anemia due to rectal cancer


Rectal CA diagnosed 10/2020


Bladder Outlet obstruction w laureano


COVID-19 EXPOSURE


pulmonary nodules


liver lesions


Enlarged prostate


CKD 3


Hydronephrosis with chronic indwelling catheter


Blood loss anemia s/p 2 units rbc transfusion due to rectal ca 10/21/20


unprovoked DVT - RLE - 2013 not on NOAC due to rectal ca


HTN


  


PLAN:


no acute medical issues after transfusion. PFS consulted for placement, as pt 

cannot care for himself.


family is in [place]. in contact/eyewear/droplet isolation due to recent covid 

exposure. no c/o fever/chills/cough/


loss of taste/gi complaints.





VS, I&O, 24H, Fishbone


Vital Signs/I&O





Vital Signs








  Date Time  Temp Pulse Resp B/P (MAP) Pulse Ox O2 Delivery O2 Flow Rate FiO2


 


10/30/20 09:44  73  147/76    


 


10/30/20 06:00 98.1  17  95 Room Air  














I&O- Last 24 Hours up to 6 AM 


 


 10/30/20





 06:00


 


Intake Total 800 ml


 


Output Total 625 ml


 


Balance 175 ml











Laboratory Data


24H LABS


Laboratory Tests 2


10/29/20 11:58: 


Immature Granulocyte % (Auto) 0.3, Neutrophils (%) (Auto) 69.7H, Lymphocytes (%)

(Auto) 15.0L, Monocytes (%) (Auto) 12.0H, Eosinophils (%) (Auto) 2.5, Basophils 

(%) (Auto) 0.5, Neutrophils # (Auto) 5.3, Lymphocytes # (Auto) 1.1L, Monocytes #

(Auto) 0.9H, Eosinophils # (Auto) 0.2, Basophils # (Auto) 0.0, Reticulocyte # 

(auto) 58.7, Nucleated Red Blood Cells % (auto) 0.0, Differential Slide Review 

Report, Peripheral Blood Smear Path Consult PERIPHERAL SMEAR, Percent 

Reticulocyte Count 1.9H, Reticulocyte Hemoglobin Equivalent 33.5, Prothrombin 

Time 13.9, Prothromb Time International Ratio 1.05, Anion Gap 6L, Glomerular 

Filtration Rate 40.2, Calcium Level 8.7L, Iron Level 52L, Total Iron Binding 

Capacity 332, Transferrin % Saturation 15.7L, Ferritin 39, Total Bilirubin 0.1L,

Direct Bilirubin < 0.1, Aspartate Amino Transf (AST/SGOT) 19, Alanine 

Aminotransferase (ALT/SGPT) 10L, Alkaline Phosphatase 70, Total Creatine Kinase 

86, Creatine Kinase MB 2.4, Creatine Kinase MB Relative Index 2.79, Troponin I <

0.02, Total Protein 6.5, Albumin 2.5L, Albumin/Globulin Ratio 0.6, Lipase 138


10/29/20 19:10: 


Urine Color YELLOW, Urine Appearance HAZY, Urine pH 5.0, Urine Specific Gravity 

1.014, Urine Protein 1+H, Urine Glucose (UA) NEGATIVE, Urine Ketones NEGATIVE, 

Urine Blood 1+H, Urine Nitrite NEGATIVE, Urine Bilirubin NEGATIVE, Urine 

Urobilinogen 0.2, Urine Leukocyte Esterase NEGATIVE, Urine WBC (Auto) 2, Urine 

RBC (Auto) 11H, Urine Hyaline Casts (Auto) 1, Urine Bacteria (Auto) NEGATIVE, 

Urine Squamous Epithelial Cells 0, Urine Mucus (Auto) SMALL, Urine Sperm (Auto) 


10/30/20 06:20: 


Nucleated Red Blood Cells % (auto) 0.0, Anion Gap 6L, Glomerular Filtration Rate

47.0, Calcium Level 8.3L, Total Bilirubin 0.3#, Aspartate Amino Transf 

(AST/SGOT) 14, Alanine Aminotransferase (ALT/SGPT) 11L, Alkaline Phosphatase 68,

Total Protein 6.1L, Albumin 2.4L, Albumin/Globulin Ratio 0.6, Magnesium Level 

1.7L


CBC/BMP


Laboratory Tests


10/29/20 11:58








10/30/20 06:20








Microbiology





Microbiology


10/30/20 Stool Occult Blood (ELLA) - Final, Complete











SPENCER WILSON MD            Oct 30, 2020 11:28

## 2020-10-30 NOTE — ECGEPIP
[facility] - ED

                                       

                                       Test Date:    2020-10-29

Pat Name:     VIRGINIA MEDRANO          Department:   

Patient ID:   [id number]                 Room:         -

Gender:       Male                     Technician:   

:          [id number]               Requested By: Maria Luisa Zhang 

Order Number: [id number]     Reading MD:   Nolan Ewing

                                 Measurements

Intervals                              Axis          

Rate:         85                       P:            57

VA:           152                      QRS:          3

QRSD:         81                       T:            50

QT:           350                                    

QTc:          416                                    

                           Interpretive Statements

SINUS RHYTHM

LOW VOLTAGE IN LIMB LEADS

Similar to tracing done 10-13-20

Electronically Signed on 10- 7:06:31 EDT by Nolan Ewing

## 2020-10-31 NOTE — IPNPDOC
Date Seen


The patient was seen on 10/31/20.





Progress Note


SUBJECTIVE:


despite covid -19 exposure, pt not exhibiting f/c/cough/sob/loss of taste. still

c/o


generalized weakness. denies lightheadedness since 2 u rbc transfusion. still


w scant amt blood w bm due to rectal ca, which he does not want to betreated,but


pt not ready to be cmo/hospice. 





OBJECTIVE:


PHYSICAL EXAMINATION:


VITAL SIGNS: See below


General: asleep, but easily arousable.  no distress. no icterus or pallor


HEENT: NC/AT, EOMI,dry mucus mm


Lungs: CTA B/L AEBE 


Heart: +S1S2, RRR


Abd: soft, NT, +BS x 4quadrants leg bag chronic indwelling laureano


Ext: no edema





LABORATORY DATA: See below.





MICROBIOLOGY: Please see below. 





IMAGING: 





CT HEAD WITHOUT CONTRAST 10/29/20


INDICATION:


dizzy.


COMPARISON:


None.


TECHNIQUE:


Helical scanning is acquired. 5 mm axial images were reformatted.  Coronal MPR 

images


were generated.


FINDINGS:


Bone window settings demonstrate intact bony calvarium.  Visualized paranasal si

nuses


are clear.  There is moderate vascular calcification in the distal internal 

carotid


arteries bilaterally.  No intraorbital abnormality is seen.  On soft tissue 

window


settings, there is generalized volume loss.  There is periventricular low 

density in


the right frontal lobe on the right consistent with a lacunar infarction.  This


appears to be old.  Similarly, there is a lacunar infarct in the lateral aspect 

of the


basal ganglia on the left.  No acute infarction is appreciated.  No evidence of


intracranial hemorrhage is seen.  No mass, extra-axial fluid collection, or 

midline


shift.


IMPRESSION:


There is diffuse atrophy and vascular calcification.  There is a lacunar infarct

in


the right periventricular frontal lobe white matter and another in the left 

basal


ganglia.  Both of these appear to be old.  No acute intracranial abnormality is


appreciated..


<Electronically signed by Onur Oviedo > 10/29/20 1420





ASSESSMENT: 81 yo male brought in by family for SNF placement and c/o 

lightheadedness.





PROBLEMS:


 Symptomatic anemia due to rectal cancer, s/p 2 u rbc transfusion 10/29/20


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


Physical disconditioning


Rectal CA diagnosed 10/2020,refuses treatment


Bladder Outlet obstruction w laureano


COVID-19 EXPOSURE


pulmonary nodules


liver lesions


Enlarged prostate


CKD 3


Hydronephrosis with chronic indwelling catheter


Blood loss anemia s/p 2 units rbc transfusion due to rectal ca 10/21/20


unprovoked DVT - RLE - 2013 not on NOAC due to rectal ca


HTN


  


PLAN:


continue w contact, droplet/eye precautions due to exposure to covid-to complete

total


of 14days isolation. no acute covid symptoms. pt refuses to be treated for 

rectal ca.


supportive care for now. awaiting placement, as pt's daughter is unable to care 

for him,


preferably closer to her in [place], near [place].  continue present 

management. encourage


ambulation, and PT. compression stockings for DVT prophylaxis. no AC or antiplt 

due to 


bleeding rectal cancer, which is chronic. He does NOT want to be cmo or hospice,

and still


wants supportive care.





VS, I&O, 24H, Fishbone


Vital Signs/I&O





Vital Signs








  Date Time  Temp Pulse Resp B/P (MAP) Pulse Ox O2 Delivery O2 Flow Rate FiO2


 


10/31/20 08:31  68  128/70    


 


10/31/20 06:00 98.1  18  97 Room Air  














I&O- Last 24 Hours up to 6 AM 


 


 10/31/20





 06:00


 


Intake Total 1710 ml


 


Output Total 1550 ml


 


Balance 160 ml











Laboratory Data


Microbiology





Microbiology


10/30/20 Stool Occult Blood (ELLA) - Final, Complete











SPENCER WILSON MD            Oct 31, 2020 09:23

## 2020-11-01 NOTE — IPNPDOC
Date Seen


The patient was seen on 11/1/20.





Progress Note


SUBJECTIVE:


no new complaints, awaiting placement. no f/c/sob/cough. 


ambulating w walker with assistance w/o jennings





OBJECTIVE:


PHYSICAL EXAMINATION:


VITAL SIGNS: See below


General: cooperative. dissheveled, speaks in full sentences no distress. no 

icterus or pallor


HEENT: NC/AT, EOMI,dry mucus mm no JVD


Lungs: CTA B/L AEBE no rales/wheezing


Heart: +S1S2, RRR no r/m/g


Abd: soft, NT, +BS x 4quadrants leg bag chronic indwelling laureano


Ext: no edema





LABORATORY DATA: See below.





MICROBIOLOGY: Please see below. 





IMAGING: 





CT HEAD WITHOUT CONTRAST 10/29/20


INDICATION:


dizzy.


COMPARISON:


None.


TECHNIQUE:


Helical scanning is acquired. 5 mm axial images were reformatted.  Coronal MPR 

images


were generated.


FINDINGS:


Bone window settings demonstrate intact bony calvarium.  Visualized paranasal s

inuses


are clear.  There is moderate vascular calcification in the distal internal 

carotid


arteries bilaterally.  No intraorbital abnormality is seen.  On soft tissue 

window


settings, there is generalized volume loss.  There is periventricular low 

density in


the right frontal lobe on the right consistent with a lacunar infarction.  This


appears to be old.  Similarly, there is a lacunar infarct in the lateral aspect 

of the


basal ganglia on the left.  No acute infarction is appreciated.  No evidence of


intracranial hemorrhage is seen.  No mass, extra-axial fluid collection, or 

midline


shift.


IMPRESSION:


There is diffuse atrophy and vascular calcification.  There is a lacunar infarct

in


the right periventricular frontal lobe white matter and another in the left 

basal


ganglia.  Both of these appear to be old.  No acute intracranial abnormality is


appreciated..


<Electronically signed by Onur Oviedo > 10/29/20 [id number]





ASSESSMENT: 81 yo male brought in by family for SNF placement and c/o lighth

eadedness.





PROBLEMS:


 Symptomatic anemia due to rectal cancer, s/p 2 u rbc transfusion 10/29/20


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


Physical disconditioning


Rectal CA diagnosed 10/2020,refuses treatment


Bladder Outlet obstruction w laureano


COVID-19 EXPOSURE


pulmonary nodules


liver lesions


Enlarged prostate


CKD 3


Hydronephrosis with chronic indwelling catheter


Blood loss anemia s/p 2 units rbc transfusion due to rectal ca 10/21/20


unprovoked DVT - RLE - 2013 not on NOAC due to rectal ca


HTN


  


PLAN:


no c/o lightheaded ness since rbc transfusion. still weak, and will need


rehab. PFS aware that pt has no support system in the area, with his


daughter in [place] unable to care for him. Placement. no other


acute medical issues. continue w contact, droplet/eye precautions 


due to exposure to covid-to complete total


of 14days isolation.





VS, I&O, 24H, Fishbone


Vital Signs/I&O





Vital Signs








  Date Time  Temp Pulse Resp B/P (MAP) Pulse Ox O2 Delivery O2 Flow Rate FiO2


 


11/1/20 06:00 97.5 63 18 167/89 (115) 16 Room Air  














I&O- Last 24 Hours up to 6 AM 


 


 11/1/20





 06:00


 


Intake Total 600 ml


 


Output Total 250 ml


 


Balance 350 ml











Laboratory Data


24H LABS


Laboratory Tests 2


10/31/20 09:34: 


Nucleated Red Blood Cells % (auto) 0.0, Anion Gap 5L, Glomerular Filtration Rate

48.4, Calcium Level 8.8, Magnesium Level 2.0


CBC/BMP


Laboratory Tests


10/31/20 09:34








Microbiology





Microbiology


10/30/20 Stool Occult Blood (ELLA) - Final, Complete











SPENCER WILSON MD             Nov 1, 2020 08:52

## 2020-11-02 NOTE — IPNPDOC
Date Seen


The patient was seen on 11/2/20.





Progress Note


SUBJECTIVE:


"I'm still weak." pt open to rehab placement. no brbpr, melena, black tarry 

stools.


no cp, sob. no SANTIAGO, or PND. despite covid exposure, no fever, chills, loss of 

taste, 


abd pain/diarrhea, sob, cough. still on contact/droplet/eye precautions.





OBJECTIVE:


PHYSICAL EXAMINATION:


VITAL SIGNS: See below


General: AAOx3 dissheveled speaks in full sentences no distress. no icterus or 

pallor


HEENT: NC/AT, EOMI,dry mucus mm no JVD chapped lips


Lungs: CTA B/L AEBE no rales/wheezing


Heart: +S1S2, RRR no r/m/g


Abd: soft, NT, +BS x 4quadrants leg bag chronic indwelling laureano


Ext: no edema





LABORATORY DATA: See below.





MICROBIOLOGY: Please see below. 





IMAGING: 





CT HEAD WITHOUT CONTRAST 10/29/20


INDICATION:


dizzy.


COMPARISON:


None.


TECHNIQUE:


Helical scanning is acquired. 5 mm axial images were reformatted.  Coronal MPR 

images


were generated.


FINDINGS:


Bone window settings demonstrate intact bony calvarium.  Visualized paranasal 

sinuses


are clear.  There is moderate vascular calcification in the distal internal 

carotid


arteries bilaterally.  No intraorbital abnormality is seen.  On soft tissue 

window


settings, there is generalized volume loss.  There is periventricular low 

density in


the right frontal lobe on the right consistent with a lacunar infarction.  This


appears to be old.  Similarly, there is a lacunar infarct in the lateral aspect 

of the


basal ganglia on the left.  No acute infarction is appreciated.  No evidence of


intracranial hemorrhage is seen.  No mass, extra-axial fluid collection, or 

midline


shift.


IMPRESSION:


There is diffuse atrophy and vascular calcification.  There is a lacunar infarct

in


the right periventricular frontal lobe white matter and another in the left 

basal


ganglia.  Both of these appear to be old.  No acute intracranial abnormality is


appreciated..


<Electronically signed by Onur Oviedo > 10/29/20 4650





ASSESSMENT: 79 yo male brought in by family for SNF placement and c/o lighthea

dedness.





PROBLEMS:


 Symptomatic anemia due to rectal cancer, s/p 2 u rbc transfusion 10/29/20


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


Physical disconditioning


Rectal CA diagnosed 10/2020,refuses treatment


Bladder Outlet obstruction w laureano


COVID-19 EXPOSURE


pulmonary nodules


liver lesions


Enlarged prostate


CKD 3


Hydronephrosis with chronic indwelling catheter


Blood loss anemia s/p 2 units rbc transfusion due to rectal ca 10/21/20


unprovoked DVT - RLE - 2013 not on NOAC due to rectal ca


HTN


unable to care for self at home


  


PLAN:


awaiting placement. pt has no new medical complaints. 


daughter requests a callf rom pfs after 1215pm. she is teaching


an online class from 11-12:15pm to discuss discharge options.





VS, I&O, 24H, Fishbone


Vital Signs/I&O





Vital Signs








  Date Time  Temp Pulse Resp B/P (MAP) Pulse Ox O2 Delivery O2 Flow Rate FiO2


 


11/2/20 09:02  79  131/72    


 


11/2/20 06:00 97.8  16  94 Room Air  














I&O- Last 24 Hours up to 6 AM 


 


 11/2/20





 06:00


 


Intake Total 570 ml


 


Output Total 2250 ml


 


Balance -1680 ml











Laboratory Data


Microbiology





Microbiology


10/30/20 Stool Occult Blood (ELLA) - Final, Complete











SPENCER WILSON MD             Nov 2, 2020 09:17

## 2020-11-03 NOTE — IPNPDOC
Text Note


Date of Service


The patient was seen on 11/3/20.





NOTE


SUBJECTIVE: Having maroon colored stools since this morning with some bright red

blood small amounts of stool. Had dark stools yesterday also. No abdominal pain,

No fever or chills, no SOB. 





PHYSICAL EXAMINATION:


VITAL SIGNS: As below


General: AAOx3 , sitting up in chair in no acute distress. 


HEENT: NC/AT, EOMI, moist mucous membranes 


Neck: no JVD , no thyromegaly


Lungs: CTA B/L vesicular breath sounds.  no rales/wheezing


Heart: +S1S2, RRR no r/m/g


Abd: soft, NT, +BS x 4quadrants leg bag with chronic indwelling laureano


Ext: no edema





LABORATORY DATA adn Radiology: reviewed. 





CT HEAD WITHOUT CONTRAST 10/29/20


There is diffuse atrophy and vascular calcification.  There is a lacunar infarct

in


the right periventricular frontal lobe white matter and another in the left 

basal


ganglia.  Both of these appear to be old.  No acute intracranial abnormality is


appreciated..





ASSESSMENT: 81 yo male with untreated Low lying Rectal cancer, BPH with 

obstructive uropathy with chronic indwelling laureano, recurrent lower GIBs  

DNR/DNI  presented to the ER with c/o lightheadedness and falls at home, He 

presented with c/o lightheadedness. CT head negative. He continues to have o

ccasional bloody rectal discharge, but "not much" and was not orthostatic in the

ER. He was having symptomatic anemia.  He agreed to rbc transfusion,








Rectal CA 


diagnosed in 10/2020


Untreated


Treatment would require the patient to have chemo/RT first to reduce the tumor, 

then abdominoperineal resection with a permanent colostomy. 


Patient stated "At my age, I am not interested in doing chemo/radiation, and a 4

hour surgery. If God decides to take me, I'm ready to go." 





Chronic blood loss anemia/ symptomatic 


from recurrent GIBs from the rectal cancer


s/p 2units of transfusion


will transfuse prn. 


HH stable. 





BPH/Bladder Outlet obstruction/ hydronephrosis


chronic indwelling laureano





CKD 3


creatinine at baseline. 





Unprovoked DVT - RLE - 2013 


not on NOAC due to rectal ca





HTN


amlodipine





COVID exposure last on 10/23/20


quarantine till 11/6/20





Simple liver cysts.





Multiple Pulmonary nodules largest 6 mm


some are spiculated 


could be metastatic nodules 





Physical deconditioning


progressive cancer, unable to take care of himself at home


for  STR





VS,Fishbone, I+O


VS, Fishbone, I+O


Laboratory Tests


11/3/20 09:37











Vital Signs








  Date Time  Temp Pulse Resp B/P (MAP) Pulse Ox O2 Delivery O2 Flow Rate FiO2


 


11/3/20 14:00 98.1 78 17 137/68 (91) 98 Room Air  














I&O- Last 24 Hours up to 6 AM 


 


 11/3/20





 05:59


 


Intake Total 840 ml


 


Output Total 1800 ml


 


Balance -960 ml

















BRANDON ROGERS MD                    Nov 3, 2020 16:38

## 2020-11-04 NOTE — IPNPDOC
Text Note


Date of Service


The patient was seen on 11/4/20.





NOTE


SUBJECTIVE: No overnight events, sitting comfortably in bed. has intermitted 

bloody stools. 





PHYSICAL EXAMINATION:


VITAL SIGNS: As below


General: AAOx3 , sitting up in chair in no acute distress. 


HEENT: NC/AT, EOMI, moist mucous membranes 


Neck: no JVD , no thyromegaly


Lungs: CTA B/L vesicular breath sounds.  no rales/wheezing


Heart: +S1S2, RRR no r/m/g


Abd: soft, NT, +BS x 4quadrants leg bag with chronic indwelling laureano


Ext: no edema





LABORATORY DATA adn Radiology: reviewed. 





CT HEAD WITHOUT CONTRAST 10/29/20


There is diffuse atrophy and vascular calcification.  There is a lacunar infarct

in


the right periventricular frontal lobe white matter and another in the left 

basal


ganglia.  Both of these appear to be old.  No acute intracranial abnormality is


appreciated..





ASSESSMENT: 79 yo male with untreated Low lying Rectal cancer, BPH with 

obstructive uropathy with chronic indwelling laureano, recurrent lower GIBs  

DNR/DNI  presented to the ER with c/o lightheadedness and falls at home, He 

presented with c/o lightheadedness. CT head negative. He continues to have 

occasional bloody rectal discharge, but "not much" and was not orthostatic in t

 ER. He was having symptomatic anemia.  He agreed to rbc transfusion,





Rectal CA 


diagnosed in 10/2020


Untreated


Treatment would require the patient to have chemo/RT first to reduce the tumor, 

then abdominoperineal resection with a permanent colostomy. 


Patient stated "At my age, I am not interested in doing chemo/radiation, and a 4

hour surgery. If God decides to take me, I'm ready to go." 





Chronic blood loss anemia/ symptomatic 


from recurrent GIBs from the rectal cancer


s/p 2units of transfusion


will transfuse prn. 


HH stable. 





BPH/Bladder Outlet obstruction/ hydronephrosis


chronic indwelling laureano





CKD 3


creatinine at baseline. 





Unprovoked DVT - RLE - 2013 


not on NOAC due to rectal ca





HTN


amlodipine





COVID exposure last on 10/23/20


quarantine till 11/6/20





Simple liver cysts.





Multiple Pulmonary nodules largest 6 mm


some are spiculated 


could be metastatic nodules 





Physical deconditioning


progressive cancer, unable to take care of himself at home


for  STR





VS,Fishbone, I+O


VS, Fishbone, I+O





Vital Signs








  Date Time  Temp Pulse Resp B/P (MAP) Pulse Ox O2 Delivery O2 Flow Rate FiO2


 


11/4/20 08:28  77  136/68    


 


11/4/20 06:00 98.3  18  97 Room Air  














I&O- Last 24 Hours up to 6 AM 


 


 11/4/20





 06:00


 


Intake Total 1600 ml


 


Output Total 1500 ml


 


Balance 100 ml

















BRANDON ROGERS MD                    Nov 4, 2020 11:05

## 2020-11-12 NOTE — IPNPDOC
Text Note


Date of Service


The patient was seen on 11/12/20.





NOTE


SUBJECTIVE: Had an episode of hematuria this week from trauma to the laureano 

stopped spontaneously.  Has intermittent eulogio and BRBPR. No other issues this 

week. 





PHYSICAL EXAMINATION:


VITAL SIGNS: As below


General: AAOx3 , sitting up in chair in no acute distress. 


HEENT: NC/AT, EOMI, moist mucous membranes 


Neck: no JVD , no thyromegaly


Lungs: CTA B/L vesicular breath sounds.  no rales/wheezing


Heart: +S1S2, RRR no r/m/g


Abd: soft, NT, +BS x 4quadrants leg bag with chronic indwelling laureano


Ext: no edema





LABORATORY DATA adn Radiology: reviewed. 





CT HEAD WITHOUT CONTRAST 10/29/20


There is diffuse atrophy and vascular calcification.  There is a lacunar infarct

in


the right periventricular frontal lobe white matter and another in the left 

basal


ganglia.  Both of these appear to be old.  No acute intracranial abnormality is


appreciated..





ASSESSMENT: 81 yo male with untreated Low lying Rectal cancer, BPH with 

obstructive uropathy with chronic indwelling laureano, recurrent lower GIBs  

DNR/DNI  presented to the ER with c/o lightheadedness and falls at home, He 

presented with c/o lightheadedness. CT head negative. He continues to have 

occasional bloody rectal discharge, but "not much" and was not orthostatic in 

the ER. He was having symptomatic anemia.  He agreed to rbc transfusion,





Rectal CA 


diagnosed in 10/2020


Untreated


Treatment would require the patient to have chemo/RT first to reduce the tumor, 

then abdominoperineal resection with a permanent colostomy. 


Patient stated "At my age, I am not interested in doing chemo/radiation, and a 4

hour surgery. If God decides to take me, I'm ready to go." 





Chronic blood loss anemia


from recurrent GIBs from the rectal cancer


s/p 2units of transfusion


will transfuse prn. 





BPH/Bladder Outlet obstruction/ hydronephrosis


chronic indwelling laureano





CKD 3


creatinine at baseline. 





Unprovoked DVT - RLE - 2013 


not on NOAC due to rectal ca





HTN


amlodipine





COVID exposure last on 10/23/20


quarantine ended 11/6/20





Simple liver cysts.





Multiple Pulmonary nodules largest 6 mm


some are spiculated 


could be metastatic nodules 





Physical deconditioning


progressive cancer, unable to take care of himself at home


for  STR





VS,Fishbone, I+O


VS, Fishbone, I+O





Vital Signs








  Date Time  Temp Pulse Resp B/P (MAP) Pulse Ox O2 Delivery O2 Flow Rate FiO2


 


11/12/20 06:00 96.7 79 16 129/69 (89) 96 Room Air  














I&O- Last 24 Hours up to 6 AM 


 


 11/12/20





 07:00


 


Intake Total 480 ml


 


Output Total 1500 ml


 


Balance -1020 ml

















BRANDON ROGERS MD                   Nov 12, 2020 07:58

## 2020-11-17 NOTE — DS.PDOC
Discharge Summary


General


Date of Admission


Oct 29, 2020 at 15:33


Date of Discharge


11/17/2020





Discharge Summary


PROCEDURES PERFORMED DURING STAY: [None].





ADMITTING DIAGNOSES: 


1. lightheaded





DISCHARGE DIAGNOSES:


1. Symptomatic anemia due to rectal cancer





COMPLICATIONS/CHIEF COMPLAINT: Lightheadedness.





HISTORY OF PRESENT ILLNESS: HPI from H&P: 79 yo male DNR/DNI  presented to the 

ER w c/o lightheadedness x3 at home, and history of falls -once on the cement in

 his garage one month ago,  for SNF placement as he is unable to take care of hi

mself. He denies any chest pain, pressure, tightness, palpitations, sob, but 

admits to dizziness without a fall this time. His daughter lives in [place] as a

 teacher with 2 teenagers and a , and will be unable to care for him. 

Despite having a chronic indwelling laureano, he denies dysuria, fever, chills, and

 malodorous urine, or flank pain. He was exposed to a COVID positive person, and

 is currently on a 14 day isolation per the Dept of health. He was recently 

diagnosed with a large low lying rectal mass by Dr. Hay, General Surgery, 

with pathology confirming  rectal cancer. He had acute blood loss anemia s/p 2 

urbc transfusion during his 10/21/20-10/27/20 hospital admission at [facility]. At that

 time, he was also found to have urine retention, due to an enlarged prostate 

causing bladder outlet obstruction,hydronephrosis requiring a laureano catheter, 

and acute renal failure complicated by enterococcus faecalis UTI.  His daughter 

lives in [place], and has discussed with both general surgery Dr. Hay, and 

Dr. Newton, hematology/oncologist, that she has reservations about a major 

surgery, as it would require the patient to have chemo/RT first to reduce the 

tumor, then abdominoperineal resection with a permanent colostomy. He was 

discharged home 10/27/20 with plans to see Dr. Nicola russo. He presents with 

c/o lightheadedness. CT head negative. hgb 8.4 from 8.6 on discharge, and re

thomas at baseline ckd3 with creatinine of 1.7 now from 1.6-1.8 baseline. He 

continues to have occasional bloody rectal discharge, but "not much"  and was 

not orthostatic in the ER. Hospitalist was asked to admit for placement.  He 

agreed to rbc transfusion, but says, "At my age, I am not interested in doing 

chemo/radiation, and a 4 hour surgery. If God decides to take me, I'm ready to 

go."  He denies suicidal ideation or homicidal tendency. EKG sinus.





HOSPITAL COURSE: 





79 yo male with untreated Low lying Rectal cancer, BPH with obstructive uropathy

 with chronic indwelling laureano, recurrent lower GIBs  DNR/DNI  presented to the 

ER with c/o lightheadedness and falls at home, He presented with c/o 

lightheadedness. CT head negative. He continues to have occasional bloody rectal

 discharge, but "not much" and was not orthostatic in the ER. He was having 

symptomatic anemia.  He agreed to rbc transfusion,





Rectal CA 


diagnosed in 10/2020


Untreated


Treatment would require the patient to have chemo/RT first to reduce the tumor, 

then abdominoperineal resection with a permanent colostomy. 


Patient stated "At my age, I am not interested in doing chemo/radiation, and a 4

 hour surgery. If God decides to take me, I'm ready to go." 





Chronic blood loss anemia


from recurrent GIBs from the rectal cancer


s/p 2units of transfusion


will transfuse prn. 





BPH/Bladder Outlet obstruction/ hydronephrosis


chronic indwelling laureano





CKD 3


creatinine at baseline. 





Unprovoked DVT - RLE - 2013 


not on NOAC due to rectal ca





HTN


amlodipine





COVID exposure last on 10/23/20


quarantine ended 11/6/20





Simple liver cysts.





Multiple Pulmonary nodules largest 6 mm


some are spiculated 


could be metastatic nodules 





Physical deconditioning


progressive cancer, unable to take care of himself at home


for  STR





DISCHARGE MEDICATIONS: Please see below.


 


ALLERGIES: Please see below.





PHYSICAL EXAMINATION ON DISCHARGE:


Constitutional: Awake and alert, in no apparent distress in chair


ENT: Sclera are clear. Mucosa is moist. 


Respiratory:  Lungs CTA bilaterally.  No respiratory distress. No use of 

accessory muscles.


Cardiovascular: RRR S1 and S2 are normal, no murmur


Gastrointestinal: Abdomen is soft, non distended, non tender, BS present. leg 

bag with chronic indwelling laureano


Musculoskeletal: No edema


Neurologic: No focal neurological deficit


Mental Status: A&O x3, normal affect 





LABORATORY DATA: Please see below.





IMAGING:





CT HEAD WITHOUT CONTRAST 10/29/20


There is diffuse atrophy and vascular calcification.  There is a lacunar infarct

 in


the right periventricular frontal lobe white matter and another in the left 

basal


ganglia.  Both of these appear to be old.  No acute intracranial abnormality is


appreciated..





PROGNOSIS: fair





ACTIVITY: [As tolerated].





DIET: regular diet





DISPOSITION: [facility] keep home 





DISCHARGE INSTRUCTIONS:


Please follow up with your primary care physician within 1 week from discharge. 


If you do not have one, please follow up with us to schedule an appointment.


Please keep all of your follow up appointments. Please call central to book your

appointments with hospital specialists.


Please take all your medications as prescribed. 


Please call/come to Clinic or go to the Emergency Department if - Temp >101, 

intractable Nausea/Vomiting, Diarrhea, Mouth sores, Headaches, Altered mental 

status, Seizures, sudden onset of swelling, bleeding, shortness of breath or 

chest pain.





DISCHARGE CONDITION: [Stable].





TIME SPENT ON DISCHARGE: Greater than 45 minutes.





Vital Signs/I&Os





Vital Signs








  Date Time  Temp Pulse Resp B/P (MAP) Pulse Ox O2 Delivery O2 Flow Rate FiO2


 


11/17/20 06:00 98.4 86 18 125/78 (94) 93 Room Air  














I&O- Last 24 Hours up to 6 AM 


 


 11/17/20





 06:00


 


Intake Total 900 ml


 


Output Total 250 ml


 


Balance 650 ml











Laboratory Data


Labs 24H


Laboratory Tests 2


11/16/20 16:52: Coronavirus (COVID-19)(PCR) NEGATIVE





Discharge Medications


Scheduled


Amlodipine Besylate (Amlodipine Besylate) 2.5 Mg Tablet, 2.5 MG PO DAILY, 

(Reported)





Scheduled PRN


Acetaminophen (Tylenol Extra Strength) 500 Mg Tablet, 500 MG PO Q6H PRN for 

HEADACHE, (Reported)





Allergies


Coded Allergies:  


     sulfamethoxazole (Verified  Allergy, Unknown, 10/13/20)











SHANNAN TOWNSEND MD              Nov 17, 2020 11:08

## 2020-11-30 NOTE — REP
INDICATION:

abdominal distention/vomiting; r/o obstruction.  Recent diagnosis rectal carcinoma.



COMPARISON:

Comparison CT study abdomen and pelvis 15 October 2020..



TECHNIQUE:

Helical scanning was acquired and 4 mm axial images are re-formatted.  Coronal and

sagittal MPR images were generated and reviewed.  The contrast enhancement dose is 100

mL of intravenous Isovue 370.



FINDINGS:

Digital preliminary  radiograph demonstrates multiple air-fluid levels and

moderately to markedly dilated colonic loops consistent with distal colonic

obstruction.  Transverse diameter of the cecum is 12.9 cm.  On axial CT images there

is mild bilateral lower lobe platelike atelectasis in the lungs.  There is evidence of

COPD.



Multiple hepatic cysts are noted.  There is some diffuse thickening of the left

adrenal gland.  No adrenal mass lesion is observed.  There is a tiny accessory

splenule.  There is a small cyst in the body of the pancreas which measures 1.6 cm in

greatest diameter.  No other pancreatic lesion is seen.  The gallbladder is

unremarkable.  No hepatic mass lesion is observed.  There are bilateral renal cortical

cysts noted.  The largest of these is in the midpole region of the left kidney.  This

cyst measures 5.4 cm in greatest diameter.  There is no evidence of hydronephrosis

today.



A Doss catheter is seen in the urinary bladder.  Urinary bladder walls are diffusely

thickened.  Prostate is enlarged moderately as before.



There is a heterogeneously enhancing annular mass in the rectum with mural thickening

and perirectal fat nodularity.  There is and edema pattern in the presacral soft

tissues.  No definite adenopathy is appreciated.  There is left colonic

diverticulosis.  CT images confirm the presence of moderately dilated air and fluid

distended colon proximal to the rectal lesion.  Borderline size fluid-filled

small-bowel loops are noted.  There is no evidence of free intraperitoneal air.  No

ascites is seen.  Bone window settings show no focal bony destructive lesion.



IMPRESSION:

Findings consistent with distal left colonic obstruction due to annular enhancing

rectal mass.  There is nodularity in the perirectal fat.  No definite adenopathy is

seen.  The colon is rather dilated with cecum dilated to a diameter of 12.9 cm.  Left

colonic diverticulosis is noted.  There are cysts in the liver and in the body of the

pancreas as above.  Renal cortical cysts are noted.  Doss catheter in place.  Diffuse

bladder wall thickening.  No evidence of free intraperitoneal air.





<Electronically signed by Onur Oviedo > 11/30/20 [id number]

## 2020-11-30 NOTE — REP
INDICATION:

SOB;cough.



COMPARISON:

Comparison chest x-ray December 5, 2013..



TECHNIQUE:

AP semi-erect single portable view.



FINDINGS:

Lungs are exposed at a lesser level of inspiration today.  There are increased

markings in the bases and platelike atelectasis is seen in the left base.  Pleural

angles are sharp.  There is mild gaseous distention of the colon and there are loops

of colon interposed between the diaphragm and the liver on the right.  No acute

infiltrate is appreciated.  Heart size is exaggerated by the relatively low level of

inspiration.



IMPRESSION:

Low level of inspiration.  Platelike atelectasis left base.  Mild gaseous distention

of the colon.  No definite infiltrate.





<Electronically signed by Onur Oviedo > 11/30/20 [id number]

## 2020-11-30 NOTE — CR
SURGICAL CONSULTATION



REASON FOR CONSULTATION:  Possible rectal obstruction.



HISTORY OF PRESENT ILLNESS:  The patient is an 80-year-old male that I had seen

back in mid October when he presented with anemia and rectal bleeding. He

underwent evaluation with a colonoscopy that revealed a large rectal cancer in

the distal rectum. This was a circumferential tumor approximately 8 cm in

length that extended down to about a cm above the dentate line. He was noted on

his imaging workup to have a markedly distended bladder with bilateral

hydroureter and hydronephrosis. A Doss catheter was placed with decompression

of the bladder, and this had remained in place. I had spoken with the patient

and his daughter at some length about the treatment for his large distal rectal

tumor. If an attempt was to be made at a curative treatment then he should have

preoperative chemoradiation followed by an abdominoperineal resection with an

end colostomy. The patient should also have a urologic workup, as his bladder

distention is likely related to his prostate. The patient declined any

aggressive treatment, and instead was discharged with a Doss catheter in place

to nursing home care. He apparently has a Do Not Resuscitate order in place at

the nursing home. He apparently developed a fever with some abdominal

distention. He apparently had an x-ray that suggested a bowel obstruction, and

he was sent to the Emergency Department for evaluation. He had a CT scan in the

Emergency Department that showed some fluid and air distention of the colon

down to the known rectal mass. There were no signs of perforation. He was also

found to have too numerous to count white cells in his urinalysis and the

Hospitalist was admitting the patient for treatment of a urinary tract

infection. I was consulted to evaluate the patient regarding a possible colonic

obstruction. 



MEDICATIONS AT TIME OF ADMISSION:

1.  Tylenol as needed for headache. 

2.  Rectal suppositories as needed for constipation.

3.  Vitamin B-12 daily. 

4.  Milk of Magnesia as needed for constipation.

5.  Remeron 15 mg p.o. q. h.s.

6.  Fleet's enema daily for constipation as needed.



PAST MEDICAL HISTORY:  The patient's past medical history is significant for:

1.  Hypertension. 

2.  He had a deep vein thrombosis of his right lower extremity back in 2013.

3.  He has his known rectal cancer.

4.  He has enlarged prostate with bladder outlet obstruction, now treated with

    a chronic indwelling Doss. 



PAST SURGICAL HISTORY:  The patient's past surgical history is negative other

than his recent colonoscopy. 



FAMILY HISTORY:  The patient's family history is significant that his father

had colorectal cancer. 



SOCIAL HISTORY:  The patient is denying any smoking or alcohol use. He is

currently living at the [facility]. He had been living out of town in

his trailer home. He has a daughter who lives in [place].



REVIEW OF SYSTEMS: No chest pain or palpitations. He does report he has had a

nagging cough for the last week or two. He denies any history of seizure or

stroke. He has had some passage of flatus. He reports that approximately

nightly he notes leakage of some liquid stool at night. He has not seen any

significant bleeding recently. He has had no nausea or vomiting. He does have

an indwelling Doss catheter. 



PHYSICAL EXAMINATION:  

GENERAL APPEARANCE: The patient is lying quietly on a stretcher in the

Emergency Department. He is alert and oriented and recognized from his visit

back in October. 

VITAL SIGNS: His most recent vital signs show a pulse of 72, respirations 0f

15, blood pressure of 144/70 and a room air oxygen saturation of 94%.

SKIN: Warm and dry. 

HEART: Regular rhythm. 

LUNGS: Clear.

ABDOMEN: Protuberant and distended. He does have bowel sounds present. The

abdomen is soft and nontender to palpation. 



LABORATORY STUDIES:  White count of 11, hemoglobin 10, hematocrit 31 and a

platelet count of 226,000. Differential count shows 75% neutrophils, 16%

lymphocytes and 9% monocytes. 



Chemistry profile shows a sodium of 140, potassium 3.8, chloride 109, CO2 of

24, BUN of 24, creatinine 1.5 and  a glucose of 110. 



Liver function tests are normal. Total protein and albumin are both slightly

low at 6.1 and 2.7 respectively. 



Urinalysis showed 2+ protein, 1+ blood, 3+ leukocyte esterase and too numerous

to count white cells. There were 22 red cells noted. 



IMAGING DATA:  CT scan of the abdomen and pelvis showed several small hepatic

cysts. Doss catheter was noted in the bladder with thickened bladder walls

noted. Prostate was noted to be moderately enlarged. The mass in the rectum was

noted with mural thickening as well as some irregularities of the rectal wall

and a suggestion of some perirectal adenopathy. The colon was moderately

dilated with air and fluid. There were still haustral folds noted, suggesting

that it was not tensely distended. 



His hydronephrosis appeared significantly improved. 



IMPRESSION:  The patient is an 80-year-old man with a known large rectal cancer

which would require an abdominoperineal resection for treatment and this he has

declined. He has at least some degree of obstruction, though clearly it is not

complete at the level of his tumor. He is passing some flatus and has had some

loose stool leaking at night. Development of a worsening obstruction is

certainly not unexpected and is one of the things that I believe we spoke about

back in October. 



RECOMMENDATIONS:  I discussed with the patient that he clearly is not

completely obstructed at this point. I discussed with him that he could

continue to decline any surgical treatment and when he becomes completely

obstructed, the treatment would then be analgesics and antiemetics and he would

be unable to eat and would likely succumb within a relatively short period of

time. If he elects not to do any surgical treatment, I advised him that it is

possible that his colon would perforate due to obstruction and this would lead

to peritonitis and also lead to his death, though likely somewhat quicker. I

did suggest that one option that would be involving surgery but less than a

rectal resection, would be to consider a diverting colostomy. This could likely

be done largely laparoscopically or through a small incision and would involve

just mobilizing some part of his descending or sigmoid colon and bringing it

out as a colostomy. This should decompress his colon adequately and allow him

to continue to eat until such time as some other complication of his medical

problems leads to his demise. I left him to think about this and hopefully he

will also discuss this with his daughter. I will speak with him tomorrow to see

if he has come to a conclusion whether to avoid all surgery and consider

Hospice or whether to consider a diverting colostomy.

ALYSON

## 2020-11-30 NOTE — HPEPDOC
General


Date of Admission


11/30/20


Date of Service:  Nov 30, 2020


Chief Complaint


The patient is a 80-year-old male admitted with a reason for visit of fever abd 

distension.


Source:  Patient, RN/MD, Old records





History of Present Illness


80 year old male diagnosed with Rectal cancer in October 2020 and had refused 

definitive surgery, chronic indwelling laureano,  before presented from [facility] for 

abdominal distension and fever. He had a abdominal xray done this am at [facility] 

and was found to have bowel obstruction Vs ileus so was sent to the ED. Patient 

reports that yesterday night he had vomiting  2 to 3 times and then he felt 

better and went to sleep. He noticed a little abdominal distension yesterday but

did not have any pain. He reports that he has liquid stool incontinence at night

reports that he had flatus. Today he had a fever of 104 and he his abdomen was 

more distended. He denied any abdominal pain or any nausea or vomiting. Work up 

In the ED showed Large bowel obstruction. He also had a very dirty ua. He was 

admitted for bowel obstruction and possible catheter related UTI.





Home Medications


Scheduled


Cyanocobalamin (Vitamin B-12) (Vitamin B-12) 1,000 Mcg Tab.subl, 1,000 MCG SL 

DAILY, (Reported)


Mirtazapine (Remeron) 15 Mg Tablet, 15 MG PO QHS, (Reported)





Scheduled PRN


Acetaminophen (Tylenol Extra Strength) 500 Mg Tablet, 500 MG PO Q6H PRN for 

HEADACHE, (Reported)


Bisacodyl (Bisacodyl) 10 Mg Supp.rect, 10 MG NV DAILY PRN for CONSTIPATION, 

(Reported)


Milk Of Magnesia (Milk of Magnesia) 2,400 Mg/10 Ml Oral.susp, 10 ML PO DAILY PRN

for CONSTIPATION, (Reported)


Sodium Phosphate,Mono-Dibasic (Fleet Enema) 133 Ml Enema, 1 PASCALE NV DAILY PRN for

CONSTIPATION, (Reported)





Allergies


Coded Allergies:  


     sulfamethoxazole (Verified  Allergy, Unknown, 10/13/20)





Past Medical History


Medical History


Rectal CA diagnosed in 10/2020


Chronic blood loss anemia from recurrent GIBs from the rectal cancer


BPH/Bladder Outlet obstruction/ h/o hydronephrosis with chronic indwelling laureano


CKD 3


Unprovoked DVT - RLE - 2013 


HTN


Simple liver cysts.


Multiple Pulmonary nodules largest 6 mm some are spiculated.





Family History


          PATERNAL GRAND FATHER: COLON CARCINOMA, ALS,


          SIBLINGS: DIAGNOSED WITH DIABETES


          PATERNAL GRAND MOTHER: UNSPECIFIED HEART DISEASE





Social History


* Smoker:  former Smoker


Alcohol:  Denies


Drugs:  denies





A-FIB/CHADSVASC


A-FIB History


Current/History of A-Fib/PAF?:  No





Review of Systems


Constitutional:  Reports: Fever, Fatigue


Eyes:  Denies: Pain, Vision change


ENT:  Denies: Head Aches, Ear Pain, Dysphagia


Skin:  Denies: Rash, Lesions, Breakdown


Pulmonary:  Reports: Cough


Cardiovascular:  Denies: Chest Pain, Palpitations, Orthopnea, Paroxysmal Noc. 

Dyspnea, Lt Headedness


Gastrointestinal:  Reports: Vomiting, Hematochezia, Other Symptoms (distension)


Genitourinary:  Reports: Retention, Other Symptoms (chronic laureano)


Hematologic:  Denies: Bruising, Bleeding Excessively


Musculoskeletal:  Denies: Neck Pain, Back Pain, Joint Pain, Muscle Pain, Spasms





Physical Examination


General Exam:  Positive: Alert, Cooperative, No Acute Distress


Eye Exam:  Positive: PERRLA, Conjunctiva & lids normal, EOMI; 


   Negative: Sclera icteric


ENT Exam:  Positive: Atraumatic, Mucous membr. moist/pink, Pharynx Normal


Neck Exam:  Positive: Supple; 


   Negative: JVD, thyromegaly


Chest Exam:  Positive: Clear to auscultation, Normal air movement


Heart Exam:  Positive: Rate Normal, Regular Rhythm, Normal S1, Normal S2; 


   Negative: Murmurs, Rubs


Abdomen Exam:  Positive: BS Hyperactive, Soft, Other (distended, tinkling 

sounds.); 


   Negative: Tenderness


Extremity Exam:  Negative: Clubbing, Cyanosis, Edema


Skin Exam:  Positive: Nl turgor and temperature; 


   Negative: Breakdown, Lesion


Neuro Exam:  Positive: Normal Speech, Strength at 5/5 X4 ext, Normal Tone





Vital Signs





Vital Signs








  Date Time  Temp Pulse Resp B/P (MAP) Pulse Ox O2 Delivery O2 Flow Rate FiO2


 


11/30/20 15:15  77 16 146/72 (96) 93 Room Air  


 


11/30/20 12:33 99.3       











Laboratory Data


Labs 24H


Laboratory Tests 2


11/30/20 12:48: 


Immature Granulocyte % (Auto) 0.5, Neutrophils (%) (Auto) 74.8H, Lymphocytes (%)

(Auto) 15.5L, Monocytes (%) (Auto) 8.6H, Eosinophils (%) (Auto) 0.3, Basophils 

(%) (Auto) 0.3, Neutrophils # (Auto) 8.2, Lymphocytes # (Auto) 1.7, Monocytes # 

(Auto) 0.9H, Eosinophils # (Auto) 0.0, Basophils # (Auto) 0.0, Nucleated Red 

Blood Cells % (auto) 0.0, Urine Color YELLOW, Urine Appearance CLOUDYH, Urine pH

5.0, Urine Specific Gravity 1.020, Urine Protein 2+H, Urine Glucose (UA) 

NEGATIVE, Urine Ketones NEGATIVE, Urine Blood 1+H, Urine Nitrite NEGATIVE, Urine

Bilirubin NEGATIVE, Urine Urobilinogen 0.2, Urine Leukocyte Esterase 3+H, Urine 

WBC (Auto) TNTCH, Urine RBC (Auto) 22H, Urine Hyaline Casts (Auto) 4, Urine 

Bacteria (Auto) 1+H, Urine Squamous Epithelial Cells 0, Urine Mucus (Auto) 

SMALL, Urine Sperm (Auto) , Anion Gap 7L, Glomerular Filtration Rate 46.3, 

Lactic Acid Level 2.0, Calcium Level 8.7L, Total Bilirubin 0.3, Direct Bilirubin

0.1, Aspartate Amino Transf (AST/SGOT) 34, Alanine Aminotransferase (ALT/SGPT) 

45, Alkaline Phosphatase 76, Total Protein 6.1L, Albumin 2.7L, Albumin/Globulin 

Ratio 0.8, Lipase 67L


11/30/20 12:49: 


Prothrombin Time 14.0, Prothromb Time International Ratio 1.06, Activated 

Partial Thromboplast Time 28.4


11/30/20 16:59: 


CBC/BMP


Laboratory Tests


11/30/20 12:48








Microbiology





Microbiology


11/30/20 Blood Culture, Received


           Pending


11/30/20 Blood Culture, Received


           Pending


11/30/20 Urine Culture, Received


           Pending





 Assessment/Plan


79 yo male with untreated Low lying Rectal cancer, BPH with obstructive uropathy

with chronic indwelling laureano, recurrent lower GIBs  DNR/DNI presented from [facility] 

for abdominal distension and fever. He had a abdominal xray done this am at [facility] and was found to have bowel obstruction Vs ileus so was sent to the ED. 

Patient reports that yesterday night he had vomiting  2 to 3 times and then he 

felt better and went to sleep. He noticed a little abdominal distension 

yesterday but did not have any pain. He reports that he has liquid stool 

incontinence at night reports that he had flatus. Today he had a fever of 104 

and he his abdomen was more distended. He denied any abdominal pain or any 

nausea or vomiting. Work up In the ED showed Large bowel obstruction. He also 

had a very dirty ua. He was admitted for bowel obstruction and possible catheter

related UTI. 





Large Bowel obstruction. 


CT abd/pel showed distal left colonic obstruction due to annular enhancing 

rectal mass.  There is nodularity in the perirectal fat.  No definite adenopathy

is seen.  The colon is rather dilated with cecum dilated to a diameter of 12.9 

cm.  Left colonic diverticulosis is noted. Laureano catheter in place.  Diffuse 

Bladder  wall thickening.  No evidence of free intraperitoneal air.


Surgical Consult.


NPO, IVF, Cipro and flagyl. 





Dirty UA


catheter related UTI vs colonization


cipro. 





Rectal CA 


diagnosed in 10/2020


Untreated


Treatment would require the patient to have chemo/RT first to reduce the tumor, 

then abdominoperineal resection with a permanent colostomy. 


He does not have  





Chronic blood loss anemia


from recurrent GIBs from the rectal cancer


hh now stable





BPH/Bladder Outlet obstruction/ hydronephrosis


chronic indwelling laureano





CKD 3


Cr 1.4 baseline. 


creatinine close to baseline. 





Unprovoked DVT - RLE - 2013 


not on NOAC due to rectal ca





HTN


Bp controlled at this time. 





Simple liver cysts.





Multiple Pulmonary nodules largest 6 mm


some are spiculated





Plan / VTE


VTE Prophylaxis Ordered?:  Yes











BRANDON ROGERS MD                   Nov 30, 2020 17:44

## 2020-11-30 NOTE — REP
INDICATION:

ABDOMINAL DYSTENSION



COMPARISON:

None.



TECHNIQUE:

Supine view of the abdomen and pelvis.



FINDINGS:

Marked air-filled dilated large bowel and presumed small bowel noted throughout the

visualized abdomen and pelvis.  Findings are nonspecific and differential diagnosis

includes ileus as well as mechanical large bowel obstruction.  A short catheter

projects over the pelvis.  Skeletal structures demonstrate age-related osteopenia and

degenerative changes.



IMPRESSION:

Differential diagnosis includes ileus and obstruction.





<Electronically signed by Félix Hernandez > 11/30/20 1023

## 2020-12-01 NOTE — IPNPDOC
Subjective


Date Seen


The patient was seen on 12/1/20.





Subjective


Chief Complaint/HPI


No fever over night. NO abdominal pain but remains distended. Did have some 

loose incontinent stools overnight and reports he is still passing flatus.





Objective


Physical Examination


General Exam:  Positive: Alert, Cooperative, No Acute Distress


Eye Exam:  Positive: PERRLA, Conjunctiva & lids normal, EOMI; 


   Negative: Sclera icteric


ENT Exam:  Positive: Atraumatic, Mucous membr. moist/pink, Pharynx Normal


Neck Exam:  Positive: Supple; 


   Negative: JVD, thyromegaly


Chest Exam:  Positive: Clear to auscultation, Normal air movement


Heart Exam:  Positive: Rate Normal, Regular Rhythm, Normal S1, Normal S2; 


   Negative: Murmurs, Rubs


Abdomen Exam:  Positive: BS Hyperactive, Soft, Other (distended, tinkling 

sounds.); 


   Negative: Tenderness


Extremity Exam:  Negative: Clubbing, Cyanosis, Edema


Skin Exam:  Positive: Nl turgor and temperature; 


   Negative: Breakdown, Lesion


Neuro Exam:  Positive: Normal Speech, Strength at 5/5 X4 ext, Normal Tone





Assessment /Plan


Assessment


79 yo male with untreated Low lying Rectal cancer, BPH with obstructive uropathy

with chronic indwelling laureano, recurrent lower GIBs  DNR/DNI presented from [facility] 

for abdominal distension and fever. He had a abdominal xray done this am at [facility] and was found to have bowel obstruction Vs ileus so was sent to the ED. 

Patient reports that yesterday night he had vomiting  2 to 3 times and then he 

felt better and went to sleep. He noticed a little abdominal distension 

yesterday but did not have any pain. He reports that he has liquid stool 

incontinence at night reports that he had flatus. Today he had a fever of 104 

and he his abdomen was more distended. He denied any abdominal pain or any 

nausea or vomiting. Work up In the ED showed Large bowel obstruction. He also 

had a very dirty ua. He was admitted for bowel obstruction and possible catheter

related UTI. 





Large Bowel obstruction. 


Most likely partial at this point


CT abd/pel showed distal left colonic obstruction due to annular enhancing 

rectal mass.  There is nodularity in the perirectal fat.  No definite adenopathy

is seen.  The colon is rather dilated with cecum dilated to a diameter of 12.9 

cm.  Left colonic diverticulosis is noted. Laureano catheter in place.  Diffuse 

Bladder  wall thickening.  No evidence of free intraperitoneal air.


Surgical Consult appreciated.


Patient thinking about whether to undergo diverting colostomy or not. 


NPO, IVF, Cipro and flagyl. 





Dirty UA


catheter related UTI vs colonization


cultures pending. 


cipro. 





Rectal CA 


diagnosed in 10/2020


Untreated


Treatment would require the patient to have chemo/RT first to reduce the tumor, 

then abdominoperineal resection with a permanent colostomy. 


He does not want to have any definite treatment for his cancer.  





Chronic blood loss anemia


from recurrent GIBs from the rectal cancer


hh now stable





BPH/Bladder Outlet obstruction/ h/o hydronephrosis


chronic indwelling laureano


No hydronephrosis at present.





CKD 3


Cr 1.4 baseline. 


creatinine close to baseline. 





Unprovoked DVT - RLE - 2013 


not on NOAC due to rectal ca





HTN


Bp controlled at this time. 





Simple liver cysts.





Multiple Pulmonary nodules largest 6 mm


some are spiculated





Plan/VTE


VTE Prophylaxis Ordered?:  Yes





VS, I&O, 24H, Fishbone


Vital Signs/I&O





Vital Signs








  Date Time  Temp Pulse Resp B/P (MAP) Pulse Ox O2 Delivery O2 Flow Rate FiO2


 


11/30/20 20:54 98.8 77 18 159/86 (110) 94 Room Air  











l


I&O- Last 24 Hours up to 6 AM 


 


 12/1/20





 07:00


 


Intake Total 425 ml


 


Output Total 750 ml


 


Balance -325 ml











Laboratory Data


24H LABS


Laboratory Tests 2


11/30/20 12:48: 


Immature Granulocyte % (Auto) 0.5, Neutrophils (%) (Auto) 74.8H, Lymphocytes (%)

(Auto) 15.5L, Monocytes (%) (Auto) 8.6H, Eosinophils (%) (Auto) 0.3, Basophils 

(%) (Auto) 0.3, Neutrophils # (Auto) 8.2, Lymphocytes # (Auto) 1.7, Monocytes # 

(Auto) 0.9H, Eosinophils # (Auto) 0.0, Basophils # (Auto) 0.0, Nucleated Red 

Blood Cells % (auto) 0.0, Urine Color YELLOW, Urine Appearance CLOUDYH, Urine pH

5.0, Urine Specific Gravity 1.020, Urine Protein 2+H, Urine Glucose (UA) 

NEGATIVE, Urine Ketones NEGATIVE, Urine Blood 1+H, Urine Nitrite NEGATIVE, Urine

Bilirubin NEGATIVE, Urine Urobilinogen 0.2, Urine Leukocyte Esterase 3+H, Urine 

WBC (Auto) TNTCH, Urine RBC (Auto) 22H, Urine Hyaline Casts (Auto) 4, Urine 

Bacteria (Auto) 1+H, Urine Squamous Epithelial Cells 0, Urine Mucus (Auto) 

SMALL, Urine Sperm (Auto) , Anion Gap 7L, Glomerular Filtration Rate 46.3, 

Lactic Acid Level 2.0, Calcium Level 8.7L, Total Bilirubin 0.3, Direct Bilirubin

0.1, Aspartate Amino Transf (AST/SGOT) 34, Alanine Aminotransferase (ALT/SGPT) 

45, Alkaline Phosphatase 76, Total Protein 6.1L, Albumin 2.7L, Albumin/Globulin 

Ratio 0.8, Lipase 67L


11/30/20 12:49: 


Prothrombin Time 14.0, Prothromb Time International Ratio 1.06, Activated 

Partial Thromboplast Time 28.4


11/30/20 16:59: Coronavirus (COVID-19)(PCR) NEGATIVE


CBC/BMP


Laboratory Tests


11/30/20 12:48








Microbiology





Microbiology


11/30/20 Blood Culture, Received


           Pending


11/30/20 Blood Culture, Received


           Pending


11/30/20 Urine Culture, Received


           Pending











BRANDON ROGERS MD                    Dec 1, 2020 06:45

## 2020-12-02 NOTE — IPNPDOC
Subjective


Date Seen


The patient was seen on 12/2/20.





Subjective


Chief Complaint/HPI


No acute events overnight. Did have a loose incontinent bowel last night. Spoke 

with daughter Chely yesterday. She is going to speak with patient and go over 

the options as discussed by Dr Hay with patient.





Objective


Physical Examination


General Exam:  Positive: Alert, Cooperative, No Acute Distress


Eye Exam:  Positive: PERRLA, Conjunctiva & lids normal, EOMI; 


   Negative: Sclera icteric


ENT Exam:  Positive: Atraumatic, Mucous membr. moist/pink, Pharynx Normal


Neck Exam:  Positive: Supple; 


   Negative: JVD, thyromegaly


Chest Exam:  Positive: Clear to auscultation, Normal air movement


Heart Exam:  Positive: Rate Normal, Regular Rhythm, Normal S1, Normal S2; 


   Negative: Murmurs, Rubs


Abdomen Exam:  Positive: BS Hypoactive, Soft, Other (distended, tinkling 

sounds.); 


   Negative: Tenderness


Extremity Exam:  Negative: Clubbing, Cyanosis, Edema


Skin Exam:  Positive: Nl turgor and temperature; 


   Negative: Breakdown, Lesion


Neuro Exam:  Positive: Normal Speech, Strength at 5/5 X4 ext, Normal Tone





Assessment /Plan


Assessment


81 yo male with untreated Low lying Rectal cancer, BPH with obstructive uropathy

with chronic indwelling laureano, recurrent lower GIBs  DNR/DNI presented from [facility] 

for abdominal distension and fever. He had a abdominal xray done this am at [facility] and was found to have bowel obstruction Vs ileus so was sent to the ED. 

Patient reports that yesterday night he had vomiting  2 to 3 times and then he 

felt better and went to sleep. He noticed a little abdominal distension 

yesterday but did not have any pain. He reports that he has liquid stool inc

ontinence at night reports that he had flatus. Today he had a fever of 104 and 

he his abdomen was more distended. He denied any abdominal pain or any nausea or

vomiting. Work up In the ED showed Large bowel obstruction. He also had a very 

dirty ua. He was admitted for bowel obstruction and possible catheter related 

UTI. 





Large Bowel obstruction


Most likely partial at this point


CT abd/pel showed distal left colonic obstruction due to annular enhancing 

rectal mass.  There is nodularity in the perirectal fat.  No definite adenopathy

is seen.  The colon is rather dilated with cecum dilated to a diameter of 12.9 

cm.  Left colonic diverticulosis is noted. Laureano catheter in place.  Diffuse 

Bladder  wall thickening.  No evidence of free intraperitoneal air.


Surgical Consult appreciated.


Patient ultimately decided not to have colostomy . He does not want 2 bags 

hanging from him.


will start on Full liquids. Will stop IVF, po antibiotics. 





Dirty UA


catheter related UTI vs colonization


cultures pending. Blood cultures negative till date. 


cipro. 





Hypokalemia


replaced. 





Rectal CA 


diagnosed in 10/2020


Untreated


Treatment would require the patient to have chemo/RT first to reduce the tumor, 

then abdominoperineal resection with a permanent colostomy. 


He does not want to have any definite treatment for his cancer.  





Chronic blood loss anemia


from recurrent GIBs from the rectal cancer


hh now stable





BPH/Bladder Outlet obstruction/ h/o hydronephrosis


chronic indwelling laureano


No hydronephrosis at present.





CKD 3


Cr 1.4 baseline. 


creatinine close to baseline. 





Unprovoked DVT - RLE - 2013 


not on NOAC due to rectal ca





HTN


Bp controlled at this time. 





Simple liver cysts.





Multiple Pulmonary nodules largest 6 mm


some are spiculated





Plan/VTE


VTE Prophylaxis Ordered?:  Yes





VS, I&O, 24H, Fishbone


Vital Signs/I&O





Vital Signs








  Date Time  Temp Pulse Resp B/P (MAP) Pulse Ox O2 Delivery O2 Flow Rate FiO2


 


12/1/20 22:00 98.2 79 18 153/85 (107) 94 Room Air  














I&O- Last 24 Hours up to 6 AM 


 


 12/2/20





 06:00


 


Intake Total 1800 ml


 


Output Total 1200 ml


 


Balance 600 ml











Laboratory Data


24H LABS


Laboratory Tests 2


12/2/20 06:03: 


Immature Granulocyte % (Auto) 0.3, Neutrophils (%) (Auto) 65.8, Lymphocytes (%) 

(Auto) 17.3L, Monocytes (%) (Auto) 12.3H, Eosinophils (%) (Auto) 3.8H, Basophils

(%) (Auto) 0.5, Neutrophils # (Auto) 4.2, Lymphocytes # (Auto) 1.1L, Monocytes #

(Auto) 0.8, Eosinophils # (Auto) 0.2, Basophils # (Auto) 0.0, Nucleated Red 

Blood Cells % (auto) 0.0, Anion Gap 6L, Glomerular Filtration Rate > 60.0, 

Calcium Level 7.9L


CBC/BMP


Laboratory Tests


12/2/20 06:03








Microbiology





Microbiology


11/30/20 Blood Culture - Preliminary, Resulted


           No growth after 24 hours . All specim...


11/30/20 Blood Culture - Preliminary, Resulted


           No growth after 24 hours . All specim...


11/30/20 Urine Culture, Received


           Pending











BRANDON ROGERS MD                    Dec 2, 2020 07:38

## 2020-12-03 NOTE — DS.PDOC
Discharge Summary


General


Date of Admission


Nov 30, 2020 at 17:09


Date of Discharge


12/3/20





Discharge Summary


PROCEDURES PERFORMED DURING STAY: [None].





DISCHARGE DIAGNOSES:


Partial Large Bowel obstruction patient refused surgical intervention. 


Rectal Cancer untreated


Catheter related UTi


Hypokalemia


BPH/Bladder Outlet obstruction/ h/o hydronephrosis has chronic laureano


Chronic blood loss anemia


Unprovoked DVT - RLE - 2013 


CKD stage 3


HTn


Simple liver cysts


Pulmonary nodules. 





COMPLICATIONS/CHIEF COMPLAINT: Bowel Obstruction,Uti.





HOSPITAL COURSE: 81 yo male with untreated Low lying Rectal cancer, BPH with 

obstructive uropathy with chronic indwelling laureano, recurrent lower GIBs  

DNR/DNI presented from [facility] for abdominal distension and fever. He had a 

abdominal xray done this am at [facility] and was found to have bowel obstruction Vs 

ileus so was sent to the ED. Patient reports that yesterday night he had 

vomiting  2 to 3 times and then he felt better and went to sleep. He noticed a 

little abdominal distension yesterday but did not have any pain. He reports that

 he has liquid stool incontinence at night reports that he had flatus. Today he 

had a fever of 104 and he his abdomen was more distended. He denied any 

abdominal pain or any nausea or vomiting. Work up In the ED showed Large bowel 

obstruction. He also had a very dirty ua. He was admitted for bowel obstruction 

and possible catheter related UTI. 





Large Bowel obstruction


Most likely partial at this point, however any time can become complete 

obstruction. He refused diverting colostomy. He refused CMO/Hospice. 


I expect he is going to come back to the hospital again and again with features 

of obstruction. 


CT abd/pel showed distal left colonic obstruction due to annular enhancing 

rectal mass.  There is nodularity in the perirectal fat.  No definite adenopathy

 is seen.  The colon is rather dilated with cecum dilated to a diameter of 12.9 

cm.  Left colonic diverticulosis is noted. Laureano catheter in place.  Diffuse 

Bladder  wall thickening.  No evidence of free intraperitoneal air.


Surgical Consult appreciated.


After several long discussion with Dr Hay and also me patient ultimately 

decided not to have colostomy . He does not want 2 bags hanging from him.


Discussed about morphine and comfort care. He refused. 


will start on Full liquids. po antibiotics. 





Dirty UA


catheter related UTI vs colonization


cultures enterococcus


Blood cultures negative till date. 


cipro. 





Hypokalemia


replaced. 





Rectal CA 


diagnosed in 10/2020


Untreated


Treatment would require the patient to have chemo/RT first to reduce the tumor, 

then abdominoperineal resection with a permanent colostomy. 


He does not want to have any definite treatment for his cancer.  





Chronic blood loss anemia


from recurrent GIBs from the rectal cancer


hh now stable





BPH/Bladder Outlet obstruction/ h/o hydronephrosis


chronic indwelling laureano


No hydronephrosis at present.





CKD 3


Cr 1.4 baseline. 


creatinine close to baseline. 





Unprovoked DVT - RLE - 2013 


not on NOAC due to rectal ca





HTN


Bp controlled at this time. 





Simple liver cysts.





Multiple Pulmonary nodules largest 6 mm


some are spiculated





DISCHARGE MEDICATIONS: Please see below.


 


ALLERGIES: Please see below.





PHYSICAL EXAMINATION ON DISCHARGE:


VITAL SIGNS: Please see below.


General Exam:  Positive: Alert, Cooperative, No Acute Distress


Eye Exam:  Positive: PERRLA, Conjunctiva & lids normal, EOMI; 


   Negative: Sclera icteric


ENT Exam:  Positive: Atraumatic, Mucous membr. moist/pink, Pharynx Normal


Neck Exam:  Positive: Supple; 


   Negative: JVD, thyromegaly


Chest Exam:  Positive: Clear to auscultation, Normal air movement


Heart Exam:  Positive: Rate Normal, Regular Rhythm, Normal S1, Normal S2; 


   Negative: Murmurs, Rubs


Abdomen Exam:  Positive: BS Hypoactive, Soft, Other (distended, tinkling 

sounds.); 


   Negative: Tenderness


Extremity Exam:  Negative: Clubbing, Cyanosis, Edema


Skin Exam:  Positive: Nl turgor and temperature; 


   Negative: Breakdown, Lesion


Neuro Exam:  Positive: Normal Speech, Strength at 5/5 X4 ext, Normal Tone





LABORATORY DATA: Please see below.





ACTIVITY: [As tolerated].





DIET: Full liquid





DISCHARGE PLAN: [facility]





DISCHARGE CONDITION: [Stable].





TIME SPENT ON DISCHARGE: 35 minutes.





Vital Signs/I&Os





Vital Signs








  Date Time  Temp Pulse Resp B/P (MAP) Pulse Ox O2 Delivery O2 Flow Rate FiO2


 


12/3/20 06:00 97.9 87 18 123/75 (91) 95 Room Air  














I&O- Last 24 Hours up to 6 AM 


 


 12/3/20





 06:00


 


Intake Total 1780 ml


 


Output Total 2250 ml


 


Balance -470 ml











Microbiology





Microbiology


11/30/20 Blood Culture - Preliminary, Resulted


           No Growth after 48 hours. All Specime...


11/30/20 Blood Culture - Preliminary, Resulted


           No Growth after 48 hours. All Specime...


11/30/20 Urine Culture - Final, Complete


           Enterococcus Faecalis





Discharge Medications


Scheduled


Ciprofloxacin HCl (Cipro) 500 Mg Tablet, 500 MG PO BID@06,18


Cyanocobalamin (Vitamin B-12) (Vitamin B-12) 1,000 Mcg Tab.subl, 1,000 MCG SL 

DAILY, (Reported)


Metronidazole (Flagyl) 500 Mg Tablet, 500 MG PO Q8H


Mirtazapine (Remeron) 15 Mg Tablet, 15 MG PO QHS, (Reported)





Scheduled PRN


Acetaminophen (Tylenol Extra Strength) 500 Mg Tablet, 500 MG PO Q6H PRN for 

HEADACHE, (Reported)


Bisacodyl (Bisacodyl) 10 Mg Supp.rect, 10 MG DE DAILY PRN for CONSTIPATION, 

(Reported)


Milk Of Magnesia (Milk of Magnesia) 2,400 Mg/10 Ml Oral.susp, 10 ML PO DAILY PRN

 for CONSTIPATION, (Reported)


Sodium Phosphate,Mono-Dibasic (Fleet Enema) 133 Ml Enema, 1 PASCALE DE DAILY PRN for

 CONSTIPATION, (Reported)





Allergies


Coded Allergies:  


     sulfamethoxazole (Verified  Allergy, Unknown, 10/13/20)











BRANDON ROGERS MD                    Dec 3, 2020 11:27

## 2020-12-21 NOTE — CR
CONSULTATION

DATE: 12/23/2020



REQUESTING PHYSICIAN: Dr. Hay



REASON FOR CONSULTATION:  [age]-year-old white male with history of known

rectal cancer. He has an obstructing mass in his rectum. He has had at least

two hospitalizations for symptoms related to obstruction but has previously

refused surgery. During one of his hospitalizations, he also required

transfusions for rectal bleeding and blood loss anemia. His most recent

hematocrit is in the mid 30s. 



There is no history of known cardiopulmonary disease. He has had a CT scan of

the chest which does demonstrate multiple pulmonary nodules. Patient has been

reluctant to pursue this as he has been reluctant to have surgery for his

rectal mass but he now realizes that this is an impedance to him maintaining a

level of comfort. 



Related to the lower bowel obstruction, he also has a history of urinary

obstruction with bilateral hydronephrosis which decompressed with a Doss

catheter. He currently does have a Doss catheter. 



His most recent electrocardiogram was during our recent hospitalization, which

looks normal. Again, he denies a history of coronary artery disease. 



SOCIAL HISTORY: He was living alone. He does have a daughter, which looks after

him. He stopped smoking in 2014. 



ALLERGIES: Apparently is intolerant to AMLODIPINE and I am uncertain what that

is. 



FAMILY HISTORY: Apparently, grandfather had colon cancer. There is some

diabetes in his family as well. 



PAST MEDICAL HISTORY: He does have a history of:

1.  Recently diagnosed rectal cancer, urinary obstruction requiring Doss

catheter.

2.  Hypertension.

3.  Pulmonary nodules as noted.

4.  Elevated PSA.

5.  Remote history of unprovoked deep vein thrombosis (DVT) in 2013.

6.  B12 deficiency.

7.  Small vessel cerebrovascular disease.

8.  Blood loss anemia requiring transfusion.

9.  Chronic renal failure, stage III.

10.  Gastric polyps.

11.  Adenomyomatosis of the gallbladder.

12.  Kidney and liver cyst.

13.  Subclinical hypothyroidism with mildly elevated TSH and normal free T4.



REVIEW OF SYSTEMS:  Noteworthy mainly for fecal incontinence, intermittent GI

obstructive symptoms. He does have a chronic Doss for urinary obstruction.

Currently denies any cardiopulmonary symptoms. He was markedly deconditioned

during his first internment at [facility] but is now independent in his

room and he has done better in that regard. 



PHYSICAL EXAMINATION:  His vital signs are stable. He has been normotensive. 

HEENT: Unremarkable except for poor dentition. He has several teeth absent. No

carotid bruits. No jugular venous distension (JVD).

HEART: Regular; no murmurs.

CHEST: Clear.

ABDOMEN: Slightly protuberant and tympanitic. Currently has a daily bowel

movement. 

EXTREMITIES: Without any edema. He does have deformities of his index finger

fingernails. 

NEUROLOGIC: Grossly intact.



IMPRESSION/PLAN:  

1.  Obstructing rectal cancer, scheduled for surgery on same. Patient has no

cardiac history. He is a remote smoker and currently with no pulmonary

symptoms. Recent CT of the chest as described. 



2.  Improved anemia; last hematocrit was in the mid 30s.



3.  Chronic renal failure, stage III.



4.  Obstructive uropathy. He does have a Doss catheter.



5.  Small vessel cerebrovascular disease, asymptomatic.



6.  Remote deep vein thrombosis (DVT) which was unprovoked many years ago.

Currently off anticoagulation. 



7.  Hypertension, currently normotensive without medications.



8.  Subclinical hypothyroid.



9.  B12 deficiency; continues on B12. 



10.  History of nonobstructive renal calculi.



11.  Adenomyomatosis of the gallbladder.



12.  Kidney and liver cyst.



13.  Multiple nodules on pulmonary CT of uncertain etiology. Patient, at this

time, does not want this pursued.



14.  Small thoracic aneurysm on CT of the chest as well.



15.  Elevated PSA of uncertain etiology. I do not think patient would want this

pursued either.



Patient is currently medically optimized.

## 2020-12-23 NOTE — HPE
HISTORY AND PHYSICAL



DATE: 12/23/2020



ADMITTING DIAGNOSIS: Rectal cancer with colorectal obstruction.



HISTORY OF PRESENT ILLNESS: The patient is an 80-year-old man who had been

evaluated for anemia and rectal bleeding with a colonoscopy that revealed a

large rectal cancer in the distal rectum. This was described as a

circumferential tumor approximately 8 cm in length that extended down to about

1 cm above the dentate line. He was noted on his imaging work-up to have a

markedly distended bladder with bilateral hydroureter and hydronephrosis and a

Doss catheter was placed with decompression. The patient had declined any

treatment for his rectal cancer. The Doss catheter was left in place with

resolution of his hydronephrosis and stabilization of his renal function. He

was discharged to a nursing facility as he was not able to fully care for

himself. He has continued to have problems with abdominal distention and some

crampy abdominal pain. He was readmitted back to [facility] in late November and

after further discussion elected to undergo a diverting colostomy. He was

counseled that this would not be of any benefit in the treatment of his cancer,

but would relieve the colonic obstruction so that he might be somewhat more

comfortable with less abdominal distention and cramping. In the nursing home he

has not been eating well and this may be unrelated to the obstruction. He is

now admitted to undergo laparoscopy and a colostomy for decompression. 



MEDICATIONS AT THE TIME OF ADMISSION: Include:

1.  Tylenol as needed for mild pain.

2.  Vitamin B12. 

3.  Milk of Magnesia. 

4.  Remeron 15 mg by mouth daily at bedtime. 

5.  Zofran as needed.



ALLERGIES: Patient reports an allergy to SULFAMETHOXAZOLE.



PAST SURGICAL HISTORY:   Completely negative other than his recent colonoscopy

at which his obstructing cancer was diagnosed. 



PAST MEDICAL HISTORY:

1.  Significant for hypertension. 

1.  He had a deep venous thrombosis of his right lower extremity in 2013.

2.  He has his known rectal cancer with obstruction.

3.  He has an enlarged prostate with bladder outlet obstruction now treated

    with a chronic indwelling Doss catheter. 



FAMILY HISTORY: Significant for his father having had colorectal cancer.  



SOCIAL HISTORY: Patient denies any tobacco use or alcohol use. He had been

living alone in his [facility] home, but has recently been living at the [facility]. He has a daughter living in [place].



REVIEW OF SYSTEMS: No history of chest pain or palpitations. He has had an

occasional cough. There is no history of seizure or stroke. He has had some

recent persistent abdominal distention with passage of some loose stool. He has

not had any nausea or vomiting recently. He has an indwelling Doss catheter. 



PHYSICAL EXAMINATION: A somewhat frail appearing older man not in acute

distress currently. 

Skin: Warm and dry.

HEENT: Sclerae are anicteric. 

Heart: Regular rhythm. 

Lungs: Clear to auscultation. 

Abdomen: Protuberant and distended. He has some bowel sounds present. The

abdomen is full, but soft and nontender to palpation. 

Rectal: Exam deferred.

Extremities: Thin with peripheral pulses present. 



IMPRESSIONS:   

1.  Rectal cancer with rectal obstruction.

2.  Hypertension. 

3.  Bladder outlet obstruction treated with chronic Doss catheter. 

4.  Distant history of deep venous thrombosis. 



PLAN: Patient is being admitted to undergo a laparoscopy with end-colostomy. He

was not able to perform a full mechanical and antibiotic bowel preparation. He

was provided a prescription for some oral neomycin and Flagyl the day before

surgery. He will receive a dose of antibiotics pre-operatively. Patient has

been counseled that this procedure is only to alleviate the rectal obstruction.

To the extent that this improves his comfort and possible ability to eat this

may be of some benefit. He has remained convinced that he does not want to have

any more aggressive care for his rectal cancer to include surgery. 

ALYSON

## 2020-12-24 NOTE — IPN
PROGRESS NOTE



DATE:  12/24/2020



SUBJECTIVE:  The patient is now postop day number one from a laparoscopic

sigmoid colostomy creation for an obstructing rectal cancer. He had a large

amount of stool and air through his ostomy yesterday starting almost

immediately after the procedure. 



VITAL SIGNS: The patient is afebrile and has been since surgery. His pulse is

in the 70's with a normal blood pressure. 



INTAKE AND OUTPUT: He has had an excellent urine output. He had 675 mL of stool

recorded yesterday. He has started taking some clear liquids today and has

tolerated them well. 



PHYSICAL EXAMINATION:  

GENERAL APPEARANCE: The patient is awake and appears quite comfortable. He has

had no pain medications since surgery other than a dose of Ibuprofen this

morning. 

HEART: Regular rhythm.

LUNGS: Clear though the sounds are somewhat distant.

ABDOMEN: Clearly less distended than it was yesterday and is soft and

nontender. The ostomy is functioning well with air and liquid stool in the

ostomy appliance. There is no unexpected tenderness. 



LABORATORY STUDIES: The patient has a white count of 9, hemoglobin 10,

hematocrit 34 and a platelet count of 272,000.



His chemistry profile shows normal electrolytes with a BUN of 24, creatinine

1.4, and a glucose of 175. 



IMPRESSION:  The patient is doing extremely well postop day one from his

colostomy for obstruction. He has had a large amount of stool and air through

the colostomy with significant decompression of the abdomen. He is tolerating

some clear liquids.



PLAN:  I advised the patient that I will advance him to a regular diet and he

can eat whatever he chooses. I have started him on some Senokot-S to stimulate

his GI tract somewhat as it has been quite distended for at least a month. If

he is doing well tomorrow, I see no reason he could not be transferred back to

the [facility]. 

ALYSON

## 2020-12-24 NOTE — RO
OPERATIVE NOTE



DATE OF OPERATION:  12/23/2020



PREOPERATIVE DIAGNOSIS: Rectal cancer with obstruction.



POSTOPERATIVE DIAGNOSIS: Rectal cancer with obstruction.



PROCEDURE PERFORMED: Laparoscopy with end sigmoid colostomy.



SURGEON: Dionicio Hay MD



ANESTHESIA: General.



INDICATIONS FOR THE PROCEDURE: The patient is an 80-year-old man who was

diagnosed with a low-lying large rectal cancer. This has led to obstruction

with marked abdominal distention. The patient is now for a diverting colostomy

and will have a laparoscopic approach to creation of an end sigmoid colostomy. 



OPERATIVE PROCEDURE: The patient was brought to the operating room and placed

on the table in a supine position. He was placed under general endotracheal

anesthesia. The patient's abdomen was prepped and draped in a sterile fashion.

The patient has an indwelling Doss which was left in place. 1/4% Marcaine was

infiltrated at the trocar sites as needed. A short supraumbilical midline

incision was made which was deepened through the peritoneum. Stay sutures were

placed and a 12 mm Ariel cannula was inserted and the abdomen inflated with

carbon dioxide gas. Initial examination showed markedly dilated loops of small

and large bowel filling the abdomen. There was adequate free space to proceed

laparoscopically. A 5 mm port was placed in the right lower quadrant about

midway between the umbilicus and the anterior-superior iliac spine and a 12 mm

port was placed low in the right lower quadrant. Graspers were inserted.



The sigmoid colon was identified in the left lower quadrant. This was fairly

redundant, making a big loop to the right and then continuing into the pelvis. A

point was selected for division of the sigmoid colon about at the midpoint of

the sigmoid. Grasping the colon was somewhat difficult because of the

distention but it was possible to grasp the colon and elevate this slightly. An

[place] stapler with a green load was placed across the selected point for

division, closed and fired. A second and subsequently third load of the stapler

were placed to complete the transection. The third load carried across the edge

of the colon and partly into the mesocolon. The harmonic scalpel was then used

to divide the mesocolon down toward its base. Some lateral attachments of the

sigmoid colon were divided. The dissection then proceeded superiorly, freeing

the mesocolon at its base using the harmonic scalpel. Dissection proceeded until

there was adequate length to easily bring the end of the colon up to the

anterior abdominal wall in the left upper quadrant without tension. The bowel

appeared to be well vascularized after mobilization. Final inspection

laparoscopically showed no evidence of bleeding. The minimal blood from the

transection of the colon was irrigated and removed. The distal end of the colon

appeared well vascularized and this was just left in place. The site selected

for the colostomy was approximately 3-5 cm above the level of the umbilicus and

approximately 7 cm to the left of the midline. A clamp was placed on the end of

the colon and the abdomen was then deflated. A small disc of skin was removed

at the site for the colostomy. The subcutaneous tissues which were quite thin

were divided with cautery. The anterior fascia was opened obliquely using the

cautery. The muscle was spread and the posterior fascia was elevated and opened

longitudinally using the cautery. The end of the colon was delivered through

this opening without any difficulty. The end of the colon was clearly viable.

The patient's abdomen was quite thin and I placed several sutures of 3-0

Vicryl, suturing the posterior fascia and peritoneum to the edge of the colon.

These were placed primarily to prevent retraction of the colostomy. The 
remaining

trocars were then removed.



The supraumbilical site was closed at the fascial level with interrupted simple

sutures of 2-0 Vicryl. The posterior fascia and peritoneum in the right lower

quadrant 12 mm site was readily identified because of his thin abdominal wall

and this was closed with interrupted sutures of 2-0 Vicryl. The muscle fascia

was then closed also with sutures of 2-0 Vicryl. The three trocar site skin

incisions were then closed with buried 4-0 Vicryl. Dermabond was applied to

these three incisions and I then proceeded with the maturation of the

colostomy. Approximately 3/4 of the staple line at the end of the bowel was

excised. Because of the distention of the bowel, this gave adequate lumen to

proceed with the colostomy. Four corner sutures of 3-0 Vicryl were placed,

taking a bite of the dermis, the wall of the colon and the end of the colon to

maylin the colon very nicely. Multiple additional 3-0 Vicryl sutures were

placed, tacking the end of the colon to the surrounding skin. The ostomy was

pink and viable. Mastisol was applied to the skin and an ostomy appliance was

placed without difficulty. The patient tolerated the procedure well without

apparent complication. He was awakened in the operating room, extubated and

moved to the recovery room in stable condition.

ALYSON

## 2020-12-24 NOTE — ECGEPIP
[facility]

                                       

                                       Test Date:    2020

Pat Name:     VIRGINIA MEDRANO          Department:   

Patient ID:   [id number]                 Room:         [place] [address]

Gender:       Male                     Technician:   

:          [id number]               Requested By: Ivan Barrera 

Order Number: [id number]     Reading MD:   Nolan Ewing

                                 Measurements

Intervals                              Axis          

Rate:         80                       P:            27

NE:           128                      QRS:          -4

QRSD:         89                       T:            27

QT:           354                                    

QTc:          410                                    

                           Interpretive Statements

SINUS RHYTHM

Low voltage in limb leads

Inferior Q waves of uncertain significance

Similar to tracing done 10-29-20

Electronically Signed on 2020 19:39:11 EST by Nolan Ewing

## 2021-01-03 NOTE — REP
INDICATION:

R/T NAUSEA



COMPARISON:

None.



TECHNIQUE:

Portable supine views of the abdomen and pelvis.



FINDINGS:

Evidence for colostomy.  Bowel gas pattern is nonspecific and includes moderate fecal

stasis.



IMPRESSION:

Moderate fecal stasis.  Bowel gas pattern is nonspecific.





<Electronically signed by Félix Hernandez > 01/03/21 [id number]

## 2021-01-04 NOTE — REP
INDICATION:

ELEVATED TEMP



COMPARISON:

11/30/2020



TECHNIQUE:

Portable AP view of the chest



FINDINGS:

The mediastinum and cardiac silhouette are stable and within normal limits for

portable technique.  Lung fields demonstrate chronic changes and perihilar/infrahilar

airspace disease cannot be excluded.  No discrete focal consolidation.  No effusion.

No pneumothorax.  Skeletal structures stable.



IMPRESSION:

Chronic changes.  Cannot exclude perihilar/infrahilar airspace disease.





<Electronically signed by Félix Hernandez > 01/04/21 0223

## 2021-01-05 NOTE — REP
INDICATION:

DISTENTION S/P DIVERSIONAL COLOSTOMY



COMPARISON:

01/03/2021



TECHNIQUE:

Supine view of the abdomen and pelvis.



FINDINGS:

Bowel gas pattern demonstrates significant air-filled loops of small and large bowel

suggesting ileus without definite obstruction or perforation.  Ostomy overlies the

left lower abdomen and surgical sutures are also appreciated in the left lower

quadrant.



IMPRESSION:

Bowel gas pattern suggestive of ileus.





<Electronically signed by Félix Hernandez > 01/05/21 [id number]

## 2021-01-07 NOTE — REPVR
PROCEDURE INFORMATION: 

Exam: CT Abdomen And Pelvis Without Contrast 

Exam date and time: 1/7/2021 4:53 PM 

Age: 80 years old 

Clinical indication: Bloating; Additional info: Distention with little ostomy 

output; R/O obstruction 



TECHNIQUE: 

Imaging protocol: Computed tomography of the abdomen and pelvis without 

contrast. 

Radiation optimization: All CT scans at this facility use at least one of these 

dose optimization techniques: automated exposure control; mA and/or kV 

adjustment per patient size (includes targeted exams where dose is matched to 

clinical indication); or iterative reconstruction. 



COMPARISON: 

CT ABD/PEL W/IV CONTRAST ONLY 11/30/2020 2:15 PM 



FINDINGS: 

Lungs: Interstitial lung disease and chronic changes are noted bilaterally. 

There is more infiltrate in density noted questionably mucous plugging 

involving both lung bases. This is incompletely visualized. Atelectasis appears 

involving the right base as well. There is also bronchiectasis. There is an 

incompletely seen questionable nodule in the left lower lobe at 5 mm on image 

201/1. This could be sequelae of infiltrate hree mucous plugging. Consider CT 

chest. There is bronchiectasis. 



Liver: The liver is not enlarged. Multiple stable liver cysts. 

Gallbladder and bile ducts: Normal. No calcified stones. No ductal dilation. 

Pancreas: There is a lesion of the pancreas likely a cyst better seen on the 

previous examination due to contrast. It appears stable at 13.5 x 12 mm. 

Spleen: Normal. No splenomegaly. 

Adrenal glands: Stable enlargement of the adrenal glands likely hypertrophy. 

Kidneys and ureters: Stable exophytic left renal cyst. Bilateral renal 

calcifications with bilateral severe hydronephrosis. There are stones noted 

within the right distal ureter. 

Stomach and bowel: There is an annular lesion again noted within the rectum. 

Has this patient had interval surgery for this lesion? This appears to grow 

into the right lateral side wall extending to the right bony pelvis. The colon 

is again very dilated to a suture line in the left upper pelvis. The rest of 

the bowel including the colon extending to the ostomy is not dilated. There is 

a new left-sided ostomy. 

Appendix: No evidence of appendicitis. 



Intraperitoneal space: Unremarkable. No free air. No significant fluid 

collection. 

Vasculature: Calcification nondilated aorta is seen. No abdominal aortic 

aneurysm. 

Lymph nodes: Numerous groin lymph nodes are seen. 

Urinary bladder: There is a Doss catheter the urinary bladder. The urinary 

bladder is still quite distended with multiple calcifications consistent with 

stones. 

Reproductive: The prostate gland is massively enlarged. 

Bones/joints: The spine is stable with a stable L2 compression fracture. 

Soft tissues: Unremarkable. 



IMPRESSION: 

1. Bilateral nephrolithiasis with bilateral severe hydronephrosis. There is a 

Doss catheter in the urinary bladder through an enlarged prostate however, the 

urinary bladder is very dilated. Stones noted within the distal right ureter as 

well as in the bladder.

2. Annular rectal lesion extending to the right pelvic sidewall. Dilated loops 

of colon are seen from the mid to distal descending colon through the lesion. 

There is a new left-sided colostomy with no evidence of obstruction proximally. 

3. Interstitial lung disease question with mucous plugging and possible left 

lower lobe nodule versus sequelae of infiltrate and mucous plugging. Consider 

CT chest. Bronchiectasis.

4. Liver cysts. 

5. Stable pancreas lesion. 

6. Stable adrenal enlargement. 



Electronically signed by: Neo Sweeney On 01/07/2021  17:37:58 PM

## 2021-01-08 NOTE — ED PDOC
Post-Departure Follow-Up


ct abd/p faxed to dr jaffe for fu mlg Lundborg-Gray,Maja MD           Jan 8, 2021 13:12

## 2021-01-19 NOTE — REPVR
PROCEDURE INFORMATION: 

Exam: CT Head Without Contrast 

Exam date and time: 1/19/2021 2:30 AM 

Age: 80 years old 

Clinical indication: Altered mental status/memory loss; Confusion or 

disorientation 



TECHNIQUE: 

Imaging protocol: Computed tomography of the head without contrast. 

Radiation optimization: All CT scans at this facility use at least one of these 

dose optimization techniques: automated exposure control; mA and/or kV 

adjustment per patient size (includes targeted exams where dose is matched to 

clinical indication); or iterative reconstruction. 



COMPARISON: 

CT Head without contrast 10/29/2020 1:58 PM 



FINDINGS: 

Brain: Cerebral volume loss and chronic white matter changes. Chronic basal 

ganglia lacunar infarctions are not significantly changed. No intracranial 

hemorrhage. No midline shift or herniation. 

Cerebral ventricles: No ventriculomegaly. 

Bones/joints: Unremarkable. No acute fracture. 

Paranasal sinuses: Visualized sinuses are unremarkable. No fluid levels. 

Mastoid air cells: Visualized mastoid air cells are well aerated. 

Soft tissues: Unremarkable. 



IMPRESSION: 

1. Cerebral volume loss and chronic white matter changes. 

2. No acute intracranial abnormality. 



Electronically signed by: Jaciel Dupree On 01/19/2021  03:46:41 AM

## 2021-01-19 NOTE — CCD
Summarization of Episode Note

                             Created on: 2020



MITCHELL VIRGINIA ARI

External Reference #: [id number]

: 1940

Sex: Male



Demographics





                          Address                   [address]

[place]  [address]

 

                          Home Phone                [phone]

 

                          Preferred Language        Unknown

 

                          Marital Status            Unknown

 

                          Sabianism Affiliation     Unknown

 

                          Race                      White

 

                          Ethnic Group              Not  or 





Author





                          Author                    [facility]

ems

 

                          Organization              [facility]

ems

 

                          Address                   Unknown

 

                          Phone                     Unavailable







Support





                Name            Relationship    Address         Phone

 

                    VIRGINIA MEDRANO    GUAR                [address]

[place]  [address] [phone]

 

                MATIAS ESQUEDA    [place], [place]   [phone]







Care Team Providers





                    Care Team Member Name Role                Phone

 

                    Jarrell Hood      Unavailable         [phone]







PROBLEMS





          Type      Condition ICD9-CM Code WYJ00-RR Code Onset Dates Condition S

tatus SNOMED 

Code                                    Notes

 

        Problem History of tobacco use         Z87.891         Active  023977509

9103  

 

        Problem Elevated PSA         R97.2           Active  449619602  

 

        Problem Uncontrolled hypertension         I10             Active  227835

03  

 

        Problem CKD (chronic kidney disease), stage III         N18.3           

Active  331056347  

 

           Problem    Essential hypertension, hypertension with unspecified goal

            I10                   

Active                    80416441                   

 

          Problem   CKD (chronic kidney disease) stage 3, GFR 30-59 ml/min      

     N18.3               Active    

[id number]                                

 

                          Problem                   Chronic deep vein thrombosis

 (DVT) of right lower extremity, unspecified

vein                  I82.501               Active     [id number]  

 

        Problem Chronic anticoagulation         Z79.01          Active  60333883

3  

 

        Problem Grieving         F43.21          Active  941166032  

 

        Problem Failure of erection         N52.9           Active  773594350  

 

          Problem   Hyperlipidemia, unspecified hyperlipidemia type           E7

8.5               Active    

[id number]                                 







ALLERGIES





                    Allergen (clinical drug ingredient) Drug/Non Drug Allergy do

cumented on EMR 

Reaction            Allergy Type        Onset Date          Status

 

           amlodipine Amlodipine(NDC Code:[address]-37) Unknown    Drug Allergy

            Active







ENCOUNTERS from 1940 to 2020





             Encounter    Location     Date         Provider     Diagnosis

 

                [facility]      [address] [place] [address]

02 08 Oct, 2020    Jarrell Hood                                 Diarrhea, unspecified type R19.7 ; Malai

se R53.81 and Encounter for 

immunization Z23







IMMUNIZATIONS





                Vaccine         Route           Administration Date Status

 

                Influenza (18 yrs & older) Flublok IM Intramuscular Oct 08, 2020

    Administered

 

                Influenza (18 yrs & older) Flublok IM Intramuscular 2019

    Administered

 

                Influenza (18 yrs & older) Flublok IM Intramuscular 2019

    Administered

 

                TDAP 0.5mL (Boostrix) IM Intramuscular 2020    Administe

red

 

                Pneumococcal 0.5mL (Prevnar 13) IM Intramuscular 2019 

 Administered

 

                Influenza (6mo & up) Fluzone IM              Dec 24, 2013    Adm

inistered







SOCIAL HISTORY

Tobacco Use:



                    Social History Observation Description         Date

 

                    Details (start date - stop date) Former Smoker        



Sex Assigned At Birth:



                          Social History Observation Description

 

                          Sex Assigned At Birth     Unknown



Education:



                    Question            Answer              Notes

 

                    Level of Education: High School          



Audit



                    Question            Answer              Notes

 

                    Total Score:        0                    

 

                    Interpretation:     Alcohol Education    



Language:



                    Question            Answer              Notes

 

                    Languages spoken:   English              



Episcopal:



                    Question            Answer              Notes

 

                    Episcopal            33 None              



Sexual Hx:



                    Question            Answer              Notes

 

                    Had sex in the last 12 months (vaginal, oral, or anal)? No  

                 

 

                    Have you ever had an STD? No                   



Drug and Alcohol



                    Question            Answer              Notes

 

                    Total Score:        0                    

 

                    Interpretation:     No problems reported  



Alcohol Screening:



                    Question            Answer              Notes

 

                    Did you have a drink containing alcohol in the past year? No

                   

 

                    Points              0                    

 

                    Interpretation      Negative             



Tobacco Use:



                    Question            Answer              Notes

 

                    Are you a:          former smoker        

 

                    How long has it been since you last smoked? 5-10 years      

     







REASON FOR REFERRAL

No Information



VITAL SIGNS





                    Weight              147 lbs             08 Oct, 2020

 

                    Height              68 in               08 Oct, 2020

 

                    BMI                 22.35 kg/m2         08 Oct, 2020

 

                    Heart Rate          90 /min             08 Oct, 2020

 

                    Respiratory Rate    18 /min             08 Oct, 2020

 

                    Temperature         98.6 degrees Fahrenheit 08 Oct, 2020

 

                    Oximetry            100%                08 Oct, 2020

 

                    Blood pressure systolic 148 mm Hg           08 Oct, 2020

 

                    Blood pressure diastolic 72 mm Hg            08 Oct, 2020







MEDICATIONS





             Medication   SIG (Take, Route, Frequency, Duration) Start Date   En

d Date     Status

 

             AmLODIPine Besylate 2.5 MG 1 tablet Orally Once a day for 30 day(s)

                           Active

 

             Acetaminophen 500 MG 1 tablet as needed Orally every 6 hrs         

                  Active







PROCEDURES





                Procedure       Date Ordered    Result          Body Site

 

                          Immunization: Flublok Quadrivalent (18 years & older) 

0.5mL IM (Influenza) 

2020-10-08                N/A                        







RESULTS





                    Component           Value               Reference Range

 

                                        CBC with Differential

Reviewed date:10/13/2020 18:52:00

Interpretation:Hgb 6.1

Performing Lab:formerly [facility] LABORATORY [address] [id number] [phone], Phone:[phone],[facility] 

 

                    WHITE BLOOD COUNT   7.6                 4.0-10.0

 

                    RED BLOOD COUNT     2.30                4.30-6.10

 

                    HEMOGLOBIN          6.1                 13.5-17.5

 

                    HEMATOCRIT          20.3                42.0-52.0

 

                    MEAN CORPUSCULAR VOLUME 88.3                80.0-96.0

 

                    MEAN CORPUSCULAR HEMOGLOBIN 26.5                27.0-33.0

 

                    MEAN CORPUSCULAR HGB CONC 30.0                32.0-36.5

 

                    RED CELL DISTRIBUTION WIDTH 19.1                11.5-14.5

 

                    PLATELET COUNT, AUTOMATED 260                 150-450

 

                    NEUTROPHILS %       66.3                36.0-66.0

 

                    LYMPH %             18.9                24.0-44.0

 

                    MONO %              10.7                0.0-5.0

 

                    EOS %               3.0                 0.0-3.0

 

                    BASO %              0.3                 0.0-1.0

 

                    NEUTROPHILS #       5.1                 1.5-8.5

 

                    LYMPH #             1.4                 1.5-5.0

 

                    MONO #              0.8                 0.0-0.8

 

                    EOS #               0.2                 0.0-0.5

 

                    BASO #              0.0                 0.0-0.2

 

                                        Comprehensive Metabolic Profile (CMP)

Reviewed date:10/13/2020 18:52:00

Interpretation:Cr 2.88

Performing Lab:[facility] LABORATORY [address] [address] [phone], Phone:[phone],[place] [address] 

 

                    GLUCOSE, FASTING    160                 

 

                    BLOOD UREA NITROGEN 54                  7-18

 

                    CREATININE FOR GFR  2.88                0.70-1.30

 

                    GLOMERULAR FILTRATION RATE 22.5                >35

 

                    SODIUM LEVEL        138                 136-145

 

                    POTASSIUM SERUM     4.3                 3.5-5.1

 

                    CHLORIDE LEVEL      106                 

 

                    CARBON DIOXIDE LEVEL 21                  21-32

 

                    CALCIUM LEVEL       8.7                 8.8-10.2

 

                    AST/SGOT            11                  7-37

 

                    ALT/SGPT            10                  12-78

 

                    ALKALINE PHOSPHATASE 65                  

 

                    BILIRUBIN,TOTAL     0.2                 0.2-1.0

 

                    TOTAL PROTEIN       6.5                 6.4-8.2

 

                    ALBUMIN             2.6                 3.2-5.2

 

                    ALBUMIN/GLOBULIN RATIO 0.7                  

 

                                        C REACTIVE PROTEIN QUANTITATIV (At [facility] L

ab)

Reviewed date:10/13/2020 18:52:00

Interpretation:5.11

Performing Lab:[facility] LABORATORY [address] [address] [phone], Phone:[phone],[place] [address] 

 

                    C REACTIVE PROTEIN QUANTITATIV 5.11                0.00-0.30

 

                                        ERYTHROCYTE SEDIMENTATION RATE

Reviewed date:10/13/2020 18:52:00

Interpretation:126

Performing Lab:[facility] LABORATORY [address] [address] [phone], Phone:[phone],[facility] 

 

                    ERYTHROCYTE SEDIMENTATION RATE 126                 0-20

 

                                        TSH

Reviewed date:10/13/2020 18:52:00

Interpretation:7.520

Performing Lab:[facility] LABORATORY [address] [address] [phone], Phone:[phone],[facility] 

 

                    THYROID STIMULATING HORMONE 7.520               0.358-3.740

 

                                        LIPID PANEL (CARDIAC RISK)

Reviewed date:10/13/2020 18:52:00

Interpretation:Tg 164, T 207, H 45, L 129

Performing Lab:[facility] LABORATORY [address] [address] [phone], Phone:[phone],[place] [address] 

 

                    TRIGLYCERIDES LEVEL 164                 <150

 

                    CHOLESTEROL LEVEL   207                 <200

 

                    HDL CHOLESTEROL     45                  >40

 

                    LDL CHOLESTEROL     129                 <100

 

                    NON-HDL-C           162                  

 

                    CHOLESTEROL RISK RATIO 4.600               <5







REASON FOR VISIT

FU



MEDICAL (GENERAL) HISTORY





                    Type                Description         Date

 

                          Medical History           DVT RLE, single unprovoked , started Xarelto x 6 months 

then convered to indefinite; anticioagulation stopped 2019 after reassessment
of bleeding risk (high at >=6.5% because of age and reduced GFR) versus 
recurrence of DVT (~5%).                 

 

                    Medical History     HTN, goal 150/90     

 

                    Medical History     tobacco abuse, in remission since  

 

 

                    Medical History     Tobacco abuse        

 

                    Surgical History    No Surgical history information  

 

                    Hospitalization History RLE DVT             







Goals Section

No Information



Health Concerns

No Information



MEDICAL EQUIPMENT

No Information



MENTAL STATUS

No Information



FUNCTIONAL STATUS

No Information



ASSESSMENTS





                    Encounter Date      Diagnosis           Notes

 

                    08 Oct, 2020        Encounter for immunization (ICD-10 - Z23

)  

 

                    08 Oct, 2020        Malaise (ICD-10 - R53.81)  

 

                    08 Oct, 2020        Diarrhea, unspecified type (ICD-10 - R19

.7)  







PLAN OF TREATMENT

Treatment Notes



                    Assessment          Notes               Clinical Notes

 

                          Diarrhea, unspecified type Patient Educated with: FLU 

Vaccine, Inactivated 

[id number] (FLU Vaccine, Inactivated [id number]) Starting out with some 

labs for patient's chronic diarrhea. Discussed that colonoscopy/ GI referral 
likely but starting with labs. Patient did not want to see GI doctor. If 
indicated he may consider this.

 

                    Malaise                                 Due to patient's tir

edness/ malaise ordering labs. Will have patient 

follow up soon to discuss labs/ monitor symptoms.

 

                    Encounter for immunization                     Recommend flu

 shot. Patient agreed.



Treatment Notes



                          Test Name                 Order Date

 

                          HEMOGLOBIN A1c            2020



Next Appt



                                        Details

 

                                        2-4 weeks Reason:labs, diarrhea, fatigue

 

                                        Provider Name:Jovi Leyva, 2020-11-10 

10:00:00 AM, [address], [place], [address], [phone]



Follow Up:2-4 weekslabs, diarrhea, fatigue



Insurance Providers





             Payer Name   Payer Address Payer Phone  Insured Name Patient Relati

onship to 

Insured                   Coverage Start Date       Coverage End Date

 

                MEDICARE Part A and B [address]  [facility] [id number]

[phone]    

VIRGINIA MEDRANO                                     

 

                          [facility] [employer]  [facility] [employer] 

[address] [place], [place] 

[address]   [phone] VIRGINIA MEDRANO self

## 2021-01-19 NOTE — CCD
Summarization of Episode Note

                             Created on: 10/30/2020



MITCHELL VIRGINIA ARI

External Reference #: [id number]

: 1940

Sex: Male



Demographics





                          Address                   [address]

[place]  [address]

 

                          Home Phone                [phone]

 

                          Preferred Language        Unknown

 

                          Marital Status            Unknown

 

                          Jewish Affiliation     Unknown

 

                          Race                      White

 

                          Ethnic Group              Not  or 





Author





                          Author                    [facility]

ems

 

                          Organization              [facility]

ems

 

                          Address                   Unknown

 

                          Phone                     Unavailable







Support





                Name            Relationship    Address         Phone

 

                    VIRGINIA MEDRANO                [address]

[place]  [address] [phone]

 

                YINMATIAS    [place], [place]   [phone]







Care Team Providers





                    Care Team Member Name Role                Phone

 

                    Jarrell Hood      Unavailable         [phone]







PROBLEMS





          Type      Condition ICD9-CM Code OOO13-AJ Code Onset Dates Condition S

tatus SNOMED 

Code                                    Notes

 

        Problem History of tobacco use         Z87.891         Active  547118127

9103  

 

        Problem Elevated PSA         R97.2           Active  713033584  

 

        Problem Uncontrolled hypertension         I10             Active  999917

03  

 

        Problem CKD (chronic kidney disease), stage III         N18.3           

Active  440460917  

 

           Problem    Essential hypertension, hypertension with unspecified goal

            I10                   

Active                    49687522                   

 

          Problem   CKD (chronic kidney disease) stage 3, GFR 30-59 ml/min      

     N18.3               Active    

808332765                                

 

                          Problem                   Chronic deep vein thrombosis

 (DVT) of right lower extremity, unspecified

vein                  I82.501               Active     [id number]  

 

        Problem Chronic anticoagulation         Z79.01          Active  24031545

3  

 

        Problem Grieving         F43.21          Active  963065616  

 

        Problem Failure of erection         N52.9           Active  218927070  

 

          Problem   Hyperlipidemia, unspecified hyperlipidemia type           E7

8.5               Active    

[id number]                                 







ALLERGIES





                    Allergen (clinical drug ingredient) Drug/Non Drug Allergy do

cumented on EMR 

Reaction            Allergy Type        Onset Date          Status

 

           amlodipine Amlodipine(NDC Code:[address]-21) Unknown    Drug Allergy

            Active







ENCOUNTERS from 1940 to 2020-10-29





             Encounter    Location     Date         Provider     Diagnosis

 

                [facility]      [facility] [place] [address] 28 

Oct, 2020    Jarrell Hood                                  







IMMUNIZATIONS





                Vaccine         Route           Administration Date Status

 

                Influenza (18 yrs & older) Flublok IM Intramuscular Oct 08, 2020

    Administered

 

                Influenza (18 yrs & older) Flublok IM Intramuscular 2019

    Administered

 

                Influenza (18 yrs & older) Flublok IM Intramuscular 2019

    Administered

 

                TDAP 0.5mL (Boostrix) IM Intramuscular 2020    Administe

red

 

                Pneumococcal 0.5mL (Prevnar 13) IM Intramuscular 2019 

 Administered

 

                Influenza (6mo & up) Fluzone IM              Dec 24, 2013    Adm

inistered







SOCIAL HISTORY

Tobacco Use:



                    Social History Observation Description         Date

 

                    Details (start date - stop date) Former Smoker        



Sex Assigned At Birth:



                          Social History Observation Description

 

                          Sex Assigned At Birth     Unknown



Education:



                    Question            Answer              Notes

 

                    Level of Education: High School          



Audit



                    Question            Answer              Notes

 

                    Total Score:        0                    

 

                    Interpretation:     Alcohol Education    



Language:



                    Question            Answer              Notes

 

                    Languages spoken:   English              



Presybeterian:



                    Question            Answer              Notes

 

                    Presybeterian            33 None              



Sexual Hx:



                    Question            Answer              Notes

 

                    Had sex in the last 12 months (vaginal, oral, or anal)? No  

                 

 

                    Have you ever had an STD? No                   



Drug and Alcohol



                    Question            Answer              Notes

 

                    Total Score:        0                    

 

                    Interpretation:     No problems reported  



Alcohol Screening:



                    Question            Answer              Notes

 

                    Did you have a drink containing alcohol in the past year? No

                   

 

                    Points              0                    

 

                    Interpretation      Negative             



Tobacco Use:



                    Question            Answer              Notes

 

                    Are you a:          former smoker        

 

                    How long has it been since you last smoked? 5-10 years      

     







REASON FOR REFERRAL

No Information



VITAL SIGNS

No information



MEDICATIONS





             Medication   SIG (Take, Route, Frequency, Duration) Start Date   En

d Date     Status

 

             AmLODIPine Besylate 2.5 MG 1 tablet Orally Once a day for 30 day(s)

                           Active

 

             Acetaminophen 500 MG 1 tablet as needed Orally every 6 hrs         

                  Active







PROCEDURES

No Information



RESULTS

No Results



REASON FOR VISIT

TCM/ACO [facility] d/c 10/27; acute anemia acute renal failure



MEDICAL (GENERAL) HISTORY





                    Type                Description         Date

 

                          Medical History           DVT RLE, single unprovoked , started Xarelto x 6 months 

then convered to indefinite; anticioagulation stopped 2019 after reassessment
of bleeding risk (high at >=6.5% because of age and reduced GFR) versus 
recurrence of DVT (~5%).                 

 

                    Medical History     HTN, goal 150/90     

 

                    Medical History     tobacco abuse, in remission since  

 

 

                    Medical History     Tobacco abuse        

 

                    Surgical History    No Surgical history information  

 

                    Hospitalization History RLE DVT             







Goals Section

No Information



Health Concerns

No Information



MEDICAL EQUIPMENT

No Information



MENTAL STATUS

No Information



FUNCTIONAL STATUS

No Information



ASSESSMENTS

No Information



PLAN OF TREATMENT

Next Appt



                                        Details

 

                                        Provider Name:Jarrell CHAPMAN Eriwn 2020

 01:30:00 PM, [address], 

[place], [address], [phone]

 

                                        Provider Name:Jovi Leyva, 2020-11-10 

10:00:00 AM, [address], [place], [address], [phone]







Insurance Providers





             Payer Name   Payer Address Payer Phone  Insured Name Patient Relati

onship to 

Insured                   Coverage Start Date       Coverage End Date

 

                MEDICARE Part A and B [address]  [facility] [address] 

[phone]    

VIRGINIA MEDRANO   self                                     

 

                          [facility] [employer]  [facility] CLAIM DIV 

[address] [facility] 

[id number] [phone] VIRGINIA MEDRANO self

## 2021-01-19 NOTE — REPVR
PROCEDURE INFORMATION: 

Exam: XR Chest, 1 View 

Exam date and time: 1/19/2021 3:30 AM 

Age: 80 years old 

Clinical indication: Other: AMS; Additional info: Altered mental status 



TECHNIQUE: 

Imaging protocol: XR of the chest 

Views: 1 view. 



COMPARISON: 

FL Chest, 1 view 1/4/2021 11:09 AM 



FINDINGS: 

Lungs: Left greater than right basilar airspace consolidation. 

Pleural space:  No pleural effusion. No pneumothorax. 

Heart/Mediastinum: Unremarkable. No cardiomegaly. 

Bones/joints: Unremarkable. 



Intraperitoneal space: There is pneumoperitoneum under the right hemidiaphragm. 

Gastrointestinal tract: Mild gaseous distention of visualized bowel in the 

upper abdomen. 



IMPRESSION: 

1. Pneumoperitoneum. 

2. Bibasilar pneumonia, worse on the left. 

3. Gaseous distention of visualized bowel in the upper abdomen. 





findings were verbally communicated via telephone conference with SRINI POLANCO at 3:41 AM EST on 1/19/2021. 



Electronically signed by: Jaciel Dupree On 01/19/2021  03:42:00 AM

## 2021-01-19 NOTE — ECGEPIP
[facility] - ED

                                       

                                       Test Date:    2021

Pat Name:     VIRGINIA MEDRANO          Department:   

Patient ID:   [id number]                 Room:         -

Gender:       Male                     Technician:   tangela

:          1940               Requested By: SIRNI DIXON

Order Number: [id number]     Reading MD:   Jovan Moss

                                 Measurements

Intervals                              Axis          

Rate:         121                      P:            42

AZ:           108                      QRS:          15

QRSD:         83                       T:            47

QT:           307                                    

QTc:          437                                    

                           Interpretive Statements

SINUS TACHYCARDIA WITH SHORT AZ INTERVAL

NSTTW ABNORMALITY(S)

RATE CHANGE COMPARED TO 20

Electronically Signed on 2021 8:19:14 EST by Jovan Moss

## 2021-01-19 NOTE — REPVR
PROCEDURE INFORMATION: 

Exam: CT Abdomen And Pelvis Without Contrast 

Exam date and time: 1/19/2021 3:42 AM 

Age: 80 years old 

Clinical indication: Other: Sepsis; Additional info: Sepsis, pneumoperitoneum 



TECHNIQUE: 

Imaging protocol: Computed tomography of the abdomen and pelvis without 

contrast. 

Radiation optimization: All CT scans at this facility use at least one of these 

dose optimization techniques: automated exposure control; mA and/or kV 

adjustment per patient size (includes targeted exams where dose is matched to 

clinical indication); or iterative reconstruction. 



COMPARISON: 

CT ABD PELVIS W/O CONTRAST 1/7/2021 4:49 PM 



FINDINGS: 

Liver: Multiple cysts in the liver are unchanged. No new liver masses. There is 

interposition of the hepatic flexure between the liver in the right 

hemidiaphragm. 

Gallbladder and bile ducts: Normal. No calcified stones. No ductal dilation. 

Pancreas: 1.7 cm cyst in the pancreatic tail is unchanged. No new pancreatic 

mass or duct dilation. 

Spleen: Normal. No splenomegaly. 

Adrenal glands: Left adrenal hyperplasia. 

Kidneys and ureters: Multiple bilateral renal cysts are unchanged. 

Hydronephrosis seen on the prior exam has resolved. 

Stomach and bowel: Stomach is mildly distended with air. No gastric outlet 

obstruction. Irregular circumferential rectal mass with surrounding nodularity 

and presacral soft tissue edema is not significantly changed. There is a tract 

extending through the right rectal wall to the right  internus muscle 

with associated muscular thickening and edema, not significantly changed. The 

rectum and sigmoid colon proximal to the mass is severely dilated with fluid, 

more severe than on the prior exam. The colon remains dilated to a suture line 

in the left pelvis. The colon proximal to the ostomy is dilated with air and 

fluid, slightly worse since the previous exam. Mild wall thickening in the 

splenic flexure of the colon. Colonic diverticulosis is present. Small bowel is 

unremarkable. 

Appendix: Not visualized. 



Intraperitoneal space: No pneumoperitoneum or abscess. Vasculature: Fusiform 

infrarenal abdominal aortic aneurysm measuring 3.0 cm is unchanged. 

Vasculature: Unremarkable. No abdominal aortic aneurysm. 

Lymph nodes: Unremarkable. No enlarged lymph nodes. 

Urinary bladder: There is a Doss catheter in the urinary bladder. There is 

circumferential wall thickening and calculi in the urinary bladder. 

Reproductive: Prostate gland is enlarged. 

Bones/joints: There are advanced degenerative changes in the spine and pelvis. 

Small right hip joint effusion. Chronic L2 compression fracture. 

Soft tissues: Generalized subcutaneous edema. 



IMPRESSION: 

1. Mild distention of the colon proximal to the ostomy. Wall thickening in the 

splenic flexure may be an artifact related to nondistention. No proximal 

colonic obstruction is seen. 

2. Worsening fluid dilation of the colon distal to the left lower quadrant 

suture line. Stable appearance of irregular circumferential mass in the rectum 

with sinus tract extending to the right  internus muscle. 

3. No pneumoperitoneum or abscess. Air and of the right hemidiaphragm on the 

prior chest radiograph is due to colonic interposition in the right upper 

quadrant. 

4. Thick-walled urinary bladder suggesting cystitis. Hydronephrosis has 

resolved. 



Electronically signed by: Jaciel Dupree On 01/19/2021  04:36:00 AM

## 2021-01-19 NOTE — IPNPDOC
Text Note


Date of Service


The patient was seen on 21.





NOTE


SUBJECTIVE:


-Hypotensive to SBP 60s this AM --> given 3rd 1L bolus while maintaining 

150cc/hr





PHYSICAL EXAMINATION:


VITAL SIGNS: see below. SBP 60s


GENERAL: Arousable but lethargic


HEENT: PERRLA, EOMI, dry MM


NECK: Supple, no noted JVD


CHEST/LUNGS:Decreased breath sounds bilaterally with left worse than right with 

some crackles and rhonchi bilaterally


HEART: Regular rate and rhythm. No murmurs, rubs, or gallops are appreciated. 


ABDOMEN:  Normoactive bowel sounds, soft, nontender, and nondistended.  

Colostomy bag is present in the left lower quadrant with brown liquid stool. 

Negative Tamayo's. no rebound or involuntary guarding


EXTREMITIES: 2+ pitting edema up to the ankles. WWP


SKIN: Scattered bruises on both upper and lower extremities


NEUROLOGIC: Lethargic, moving all extremities, no facial asymmetry





LABORATORY DATA: reviewed


WBC 8.2


lactate was 6.7


UA was +++


hgb 11


platelets 235


Bicarb 13


Cr 2.09





IMAGING: 





CT HEAD:


FINDINGS: 


Brain: Cerebral volume loss and chronic white matter changes. Chronic basal 


ganglia lacunar infarctions are not significantly changed. No intracranial 


hemorrhage. No midline shift or herniation. 


Cerebral ventricles: No ventriculomegaly. 


Bones/joints: Unremarkable. No acute fracture. 


Paranasal sinuses: Visualized sinuses are unremarkable. No fluid levels. 


Mastoid air cells: Visualized mastoid air cells are well aerated. 


Soft tissues: Unremarkable. 


IMPRESSION: 


1. Cerebral volume loss and chronic white matter changes. 


2. No acute intracranial abnormality. 





CXR:


FINDINGS: 


Lungs: Left greater than right basilar airspace consolidation. 


Pleural space:  No pleural effusion. No pneumothorax. 


Heart/Mediastinum: Unremarkable. No cardiomegaly. 


Bones/joints: Unremarkable. 


Intraperitoneal space: There is pneumoperitoneum under the right hemidiaphragm. 


Gastrointestinal tract: Mild gaseous distention of visualized bowel in the 


upper abdomen. 


IMPRESSION: 


1. Pneumoperitoneum. 


2. Bibasilar pneumonia, worse on the left. 


3. Gaseous distention of visualized bowel in the upper abdomen. 





CHEST CT:


FINDINGS: 


Lungs: There is multifocal airspace consolidation in both lungs, worse on the 


left. 


Pleural space: Small left pleural effusion. No pneumothorax. 


Heart: Normal heart size. Mild coronary artery atherosclerotic disease. 


Aorta: Unremarkable. No aortic aneurysm. 


Lymph nodes: Unremarkable. No enlarged lymph nodes. 


Bones/joints: Skeletal degenerative changes are noted. 


Soft tissues: Unremarkable. 


IMPRESSION: 


1. Bilateral pneumonia, worse on the left. 


2. Small left pleural effusion. 


3. Coronary artery disease.





CT ABD/PEL:


IMPRESSION: 


1. Mild distention of the colon proximal to the ostomy. Wall thickening in the 


splenic flexure may be an artifact related to nondistention. No proximal 


colonic obstruction is seen. 


2. Worsening fluid dilation of the colon distal to the left lower quadrant 


suture line. Stable appearance of irregular circumferential mass in the rectum 


with sinus tract extending to the right  internus muscle. 


3. No pneumoperitoneum or abscess. Air and of the right hemidiaphragm on the 


prior chest radiograph is due to colonic interposition in the right upper 


quadrant. 


4. Thick-walled urinary bladder suggesting cystitis. Hydronephrosis has 


resolved. 





MICROBIOLOGY: Please see below. 





ASSESSMENT: 79 YO M with history of recently diagnosed rectal cancer s/p 

colostomy and bladder outlet obstruction requiring chronic indwelling laureano who 

was brought in from [facility] after being found obtunded, unresponsive with vomitus on

his pillow and admitted for sepsis 2/2 bilateral multifocal PNA and CAUTI. 





PLAN:





1. Sepsis, bordering septic shock: 2/2 bilateral multifocal PNA and possible 

CAUTI


-Temp 101.3, , RR 30, Lactic acidosis, suspected source


-continue Vancomycin/Zosyn for now


-f/u MRSA screen


-1 more liter of NS bolus for hypotension and continue NS 150cc/hr 


-Initial lactic acid found to be 6.7, f/u repeat Lactic acid 


-aspiration precautions


-resp panel was negative


-supplemental O2


-will transfer to ICU with low MAP, with goal >65, may require TLC and pressors.





2. Acute hypoxic respiratory failure 2/2 bilateral pneumonia:


-Respiratory panel negative


-AB.229/19.6/59


-Empiric antibiotics as stated above


-procalcitonin, CRP, sputum culture, Legionella, urine strep pending





3. ELISHA on CKD III:


-Cr found to be 2.09


-Difficult to determine baseline Cr, but appears to be 1.5


-Urine lytes ordered


-receiving aggressive fluids, f/u repeat BMP





4. Chronic indwelling laureano with possible CAUTI:


-Nursing notes urine was cloudy on admission


-UA positive for possible UTI although patient may be chronically colonized due 

to his chronic laureano


-Covered with empiric antibiotics as above





5. Recent vomiting with ongoing lethargy:


-NPO for now


-Aspiration precautions, elevate head of bed





6. Lethargy likely metabolic encephalopathy i/s/o sepsis


-CT head without acute pathology


-septic --> tx as noted above





DVT ppx: SQH





DISPO:Pending clinical improvement. Patient is DNR/DNI





VS,Fishbone, I+O


VS, Fishbone, I+O


Laboratory Tests


21 02:43











Vital Signs








  Date Time  Temp Pulse Resp B/P (MAP) Pulse Ox O2 Delivery O2 Flow Rate FiO2


 


21 08:33    83/52 (62)    


 


21 08:30  85   93   


 


21 06:42 100.3       


 


21 02:52   30   Nasal Cannula 4.0 














I&O- Last 24 Hours up to 6 AM 


 


 21





 06:00


 


Intake Total 1270 ml


 


Balance 1270 ml

















NAVI BLAKE MD   2021 08:57

## 2021-01-19 NOTE — REPVR
PROCEDURE INFORMATION: 

Exam: CT Chest Without Contrast; Diagnostic 

Exam date and time: 1/19/2021 3:42 AM 

Age: 80 years old 

Clinical indication: Shortness of breath; Additional info: Sepsis, 

pneumoperitoneum 



TECHNIQUE: 

Imaging protocol: Diagnostic computed tomography of the chest without contrast. 

Radiation optimization: All CT scans at this facility use at least one of these 

dose optimization techniques: automated exposure control; mA and/or kV 

adjustment per patient size (includes targeted exams where dose is matched to 

clinical indication); or iterative reconstruction. 



COMPARISON: 

CT Chest without contrast 10/16/2020 12:59 PM 



FINDINGS: 

Lungs: There is multifocal airspace consolidation in both lungs, worse on the 

left. 

Pleural space: Small left pleural effusion. No pneumothorax. 

Heart: Normal heart size. Mild coronary artery atherosclerotic disease. 

Aorta: Unremarkable. No aortic aneurysm. 

Lymph nodes: Unremarkable. No enlarged lymph nodes. 



Bones/joints: Skeletal degenerative changes are noted. 

Soft tissues: Unremarkable. 



IMPRESSION: 

1. Bilateral pneumonia, worse on the left. 

2. Small left pleural effusion. 

3. Coronary artery disease. 



Electronically signed by: Jaciel Dupree On 01/19/2021  04:18:40 AM

## 2021-01-19 NOTE — REP
INDICATION:

line placement. 5:49 p.m. film.



COMPARISON:

Comparison chest x-ray 19 January 2023 20 a.m..



TECHNIQUE:

Portable upright AP chest radiograph.  5:49 p.m. film



FINDINGS:

A left subclavian line is been passed with its tip in the expected location of the

superior vena cava.  There is no evidence of pneumothorax.  A skin fold is seen

overlying the right chest.  Interstitial infiltrates are again noted in the left

perihilar region, left base, and right base medially.  Oxygen delivery tubing and

monitoring electrodes are seen..



IMPRESSION:

Left perihilar and bibasilar infiltrates unchanged.  Left subclavian line it appears

in good position.  No complication is seen..





<Electronically signed by Onur Oviedo > 01/19/21 [id number]

## 2021-01-19 NOTE — HPEPDOC
[facility] Medical History & Physical


Date of Admission


2021


Date of Service:  2021


Attending Physician:  Jason Major MD





History and Physical


CHIEF COMPLAINT: confusion, vomiting





HISTORY OF PRESENT ILLNESS: 


Gustavo Hanna is an 81 YO M with history of recently resected rectal cancer, 

bladder outlet obstruction with chronic indwelling laureano catheter who presents 

to ED from [facility] after being found obtunded and unresponsive with vomit in his 

bed. Per [facility] nursing, patient was alert and oriented and joking with the staff 

prior to bed yesterday evening. He was found at approximately 0120 with vomit on

his pillowcase. He was responsive to voice, opening his eyes but did not 

converse. Upon arrival to the ED it was noted that the patient had cloudy urine 

in his existing Laureano and bright red blood coming from his rectum. He was also 

found to be febrile with a rectal temperature of 103.1. Per ED MD, the patient's

mental status somewhat improved with IV fluids. The patient is only minimally 

responsive to questions when I enter the room.





PAST MEDICAL HISTORY:


HTN


Hx DVT in RLE 


Rectal cancer with rectal obstruction s/p laparoscopy with end-colostomy


Bladder outlet obstruction with chronic indwelling laureano catheter


Hx pulmonary nodules


B12 deficiency


Small vessel cerebrovascular disease


CKD 3


Gastric polyps


Adenomyomatosis of the gallbladder


Kidney and liver cysts


Subclinical hypothyroidism 





PAST SURGICAL HISTORY:


Laparoscopy and end-colostomy 2020





SOCIAL HISTORY:


Daughter looks after him


Quit smoking in 


Lives in [facility]





FAMILY HISTORY:


Grandfather, colon cancer





ALLERGIES: Please see below.





REVIEW OF SYSTEMS:


Unable to obtain due to patient's confusion





HOME MEDICATIONS: Please see below. 





PHYSICAL EXAMINATION:


VITAL SIGNS: see below


GENERAL: minimally responsive, eyes wide open but not tracking, arousable but 

lethargic


HEENT: PERRL, EOMI, Oral mucous membranes dry with vomitus inside mouth


NECK: The patient has no noted JVD. No adenopathy is appreciated. No thyromegaly


CHEST/LUNGS: There are decreased breath sounds bilaterally with left worse than 

right. Some rhonchi are appreciated in the RLL. There is no subcutaneous air 

appreciated. There is no tenderness to the chest wall.


HEART: Regular rate and rhythm. No murmurs, rubs, or gallops are appreciated. 

Distal pulses are 1+. 


ABDOMEN:  Soft, nontender, and nondistended. Bowel sounds are positive. 

Colostomy bag is present in the left lower quadrant with brownish liquid stool. 

No organomegaly is appreciated. No masses are appreciated. There are no 

peritoneal signs. There is no [place] sign.


EXTREMITIES: 1+ pitting edema up to the ankles. There is no focal long bone 

tenderness or deformity.


SKIN: Scattered bruises on both upper and lower extremities


PSYCHIATRIC: unable to assess


NEUROLOGIC: Strength 5/5 in all 4 extremities, unable to perform the rest of the

neurologic exam as patient not following commands








LABORATORY DATA: See below.





IMAGING: 





CT HEAD:


FINDINGS: 


Brain: Cerebral volume loss and chronic white matter changes. Chronic basal 


ganglia lacunar infarctions are not significantly changed. No intracranial 


hemorrhage. No midline shift or herniation. 


Cerebral ventricles: No ventriculomegaly. 


Bones/joints: Unremarkable. No acute fracture. 


Paranasal sinuses: Visualized sinuses are unremarkable. No fluid levels. 


Mastoid air cells: Visualized mastoid air cells are well aerated. 


Soft tissues: Unremarkable. 


IMPRESSION: 


1. Cerebral volume loss and chronic white matter changes. 


2. No acute intracranial abnormality. 





CXR:


FINDINGS: 


Lungs: Left greater than right basilar airspace consolidation. 


Pleural space:  No pleural effusion. No pneumothorax. 


Heart/Mediastinum: Unremarkable. No cardiomegaly. 


Bones/joints: Unremarkable. 


Intraperitoneal space: There is pneumoperitoneum under the right hemidiaphragm. 


Gastrointestinal tract: Mild gaseous distention of visualized bowel in the 


upper abdomen. 


IMPRESSION: 


1. Pneumoperitoneum. 


2. Bibasilar pneumonia, worse on the left. 


3. Gaseous distention of visualized bowel in the upper abdomen. 





CHEST CT:


FINDINGS: 


Lungs: There is multifocal airspace consolidation in both lungs, worse on the 


left. 


Pleural space: Small left pleural effusion. No pneumothorax. 


Heart: Normal heart size. Mild coronary artery atherosclerotic disease. 


Aorta: Unremarkable. No aortic aneurysm. 


Lymph nodes: Unremarkable. No enlarged lymph nodes. 


Bones/joints: Skeletal degenerative changes are noted. 


Soft tissues: Unremarkable. 


IMPRESSION: 


1. Bilateral pneumonia, worse on the left. 


2. Small left pleural effusion. 


3. Coronary artery disease.





CT ABD/PEL:


IMPRESSION: 


1. Mild distention of the colon proximal to the ostomy. Wall thickening in the 


splenic flexure may be an artifact related to nondistention. No proximal 


colonic obstruction is seen. 


2. Worsening fluid dilation of the colon distal to the left lower quadrant 


suture line. Stable appearance of irregular circumferential mass in the rectum 


with sinus tract extending to the right  internus muscle. 


3. No pneumoperitoneum or abscess. Air and of the right hemidiaphragm on the 


prior chest radiograph is due to colonic interposition in the right upper 


quadrant. 


4. Thick-walled urinary bladder suggesting cystitis. Hydronephrosis has 


resolved. 











MICROBIOLOGY: Please see below. 





ASSESSMENT: This is an 81 YO M with history of recently diagnosed rectal cancer 

s/p colostomy and bladder outlet obstruction requiring chronic indwelling laureano 

who presents from [facility] after being found obtunded, unresponsive with vomitus on 

his pillow. Imaging is concerning for bilateral pneumonia. 





PLAN:





1. Sepsis: likely 2/2 PNA 


-Temp 101.3, , RR 30, Lactic acidosis, suspected source


-Empiric Vancomycin/Zosyn for now


-Pending MRSA screen


-NS 150cc/hr 


-Initial lactic acid found to be 6.7, Repeat Lactic acid in 4 hr





2. Acute hypoxic respiratory failure 2/2 bilateral pneumonia:


-Respiratory panel negative


-AB.229/19.6/59


-Empiric antibiotics as stated above


-procalcitonin, CRP, sputum culture, Legionella, urine strep pending





3. ELISHA on CKD III:


-Cr found to be 2.09


-Difficult to determine baseline Cr, but appears to be 1.5


-Urine lytes ordered





4. Chronic indwelling laureano:


-Nursing notes urine was cloudy on admission


-UA positive for possible UTI although patient may be chronically colonized due 

to his chronic laureano


-Covered with empiric antibiotics as above





5. Recent vomiting:


-NPO for now


-Aspiration precautions, elevate head of bed





DVT ppx: SQH





DISPO:Pending clinical improvement. Patient is DNR/DNI





Vital Signs





Vital Signs








  Date Time  Temp Pulse Resp B/P (MAP) Pulse Ox O2 Delivery O2 Flow Rate FiO2


 


21 04:25  97   95   


 


21 04:15    110/55 (73)    


 


21 02:52 103.1  30   Nasal Cannula 4.0 











Laboratory Data


Labs 24H


Laboratory Tests 2


21 02:41: 


Urine Color DAYAN, Urine Appearance TURBIDH, Urine pH 5.0, Urine Specific 

Gravity 1.021, Urine Protein 2+H, Urine Glucose (UA) 1+H, Urine Ketones 

NEGATIVE, Urine Blood 3+H, Urine Nitrite NEGATIVE, Urine Bilirubin NEGATIVE, 

Urine Urobilinogen 0.2, Urine Leukocyte Esterase 2+H, Urine WBC (Auto) TNTCH, 

Urine RBC (Auto) 145H, Urine Hyaline Casts (Auto) 37, Urine Bacteria (Auto) 2+H,

Urine Squamous Epithelial Cells 3, Urine Granular Casts (Auto) 12, Urine Mucus 

(Auto) LARGE, Urine Sperm (Auto) 


21 02:43: 


Neutrophils (%) (Auto) , Nucleated Red Blood Cells % (auto) 0.0, Neutrophils 35,

Band Neutrophils 26H, Lymphocytes (Manual) 16, Monocytes (Manual) 2, 

Metamyelocytes 18H, Myelocytes 2H, Atypical Lymphocytes 1, Polychromasia 1+, 

Anisocytosis 2+, Platelet Estimate NORMAL, Anion Gap 14, Glomerular Filtration 

Rate 32.5L, Lactic Acid Level 6.7*H, Calcium Level 9.2, Total Bilirubin 0.4, 

Direct Bilirubin 0.2, Aspartate Amino Transf (AST/SGOT) 15, Alanine 

Aminotransferase (ALT/SGPT) 12, Alkaline Phosphatase 77, Total Creatine Kinase 

40, Creatine Kinase MB 1.8, Creatine Kinase MB Relative Index 4.50H, Troponin I 

0.05, Total Protein 5.9L, Albumin 1.9L, Albumin/Globulin Ratio 0.5, Thyroid 

Stimulating Hormone (TSH) 4.480H


21 03:16: Bedside Glucose (Misc Panel) 132H


21 04:14: 


Blood Gas Bicarbonate Standard 12.5L, Arterial Blood pH 7.299L, Arterial Blood 

Partial Pressure CO2 19.6*L, Arterial Blood Partial Pressure O2 59.0L, Arterial 

Blood Total CO2 10.0L, Arterial Blood HCO3 9.4L, Arterial Blood Base Excess -

15.2L, Arterial Blood Oxygen Saturation 87.6L


CBC/BMP


Laboratory Tests


21 02:43








Microbiology





Microbiology


21 Respiratory Virus Panel (PCR) (ELLA) - Final, Complete


          


21 Blood Culture, Received


          Pending


21 Blood Culture, Received


          Pending


21 Urine Culture, Received


          Pending





Home Medications


Scheduled


Cyanocobalamin (Vitamin B-12) (B-12) 1,000 Mcg Tablet, 1,000 MCG PO DAILY


Duloxetine Hcl (Duloxetine HCl) 60 Mg Capsule.dr, 60 MG PO DAILY


Lactose-Reduced Food (Ensure Enlive) 237 Ml Liquid, 237 ML PO TID


   0900, 1300, 2000 


Mirtazapine (Remeron) 15 Mg Tablet, 15 MG PO QHS


Sennosides/Docusate Sodium (Senna Plus Tablet) 1 Each Tablet, 2 TAB PO DAILY





Scheduled PRN


Acetaminophen (Tylenol Extra Strength) 500 Mg Tablet, 500 MG PO Q6H PRN for PAIN


Ibuprofen (Ibuprofen) 200 Mg Tablet, 400 MG PO Q6H PRN for PAIN OR DISCOMFORT


Milk Of Magnesia (Milk of Magnesia) 2,400 Mg/10 Ml Oral.susp, 10 ML PO DAILY PRN

for CONSTIPATION





Allergies


Coded Allergies:  


     sulfamethoxazole (Verified  Allergy, Unknown, 12/15/20)





A-FIB/CHADSVASC


A-FIB History


Current/History of A-Fib/PAF?:  No


Current PO Anticoag Therapy:  No





GME ATTESTATION


ATTENDING NOTE


Family Medicine Attending Note: I was present on site to supervise Debbie Dunaway DO (PGY-[id number]). We discussed the history and exam. I confirmed the key el

ements during my face-to-face encounter with the patient. We conferred on the 

assessment and plan; I agree with the note as documented. (bdr)











DEBBIE DUNAWAY MD              2021 05:55


Jason Major MD             2021 15:58

## 2021-01-19 NOTE — CCD
Summarization Of Episode

                             Created on: 2021



VIRGINIA MEDRANO

External Reference #: [id number]

: 1940

Sex: Male



Demographics





                          Address                   [address] [place]  [address]

 

                          Home Phone                [phone]

 

                          Preferred Language        English

 

                          Marital Status            

 

                          Hoahaoism Affiliation     CA

 

                          Race                      White

 

                          Ethnic Group              Not  or 





Author





                          Author                    [facility]

 

                          Organization              [facility]

 

                          Address                   Unknown

 

                          Phone                     Unavailable







Support





                Name            Relationship    Address         Phone

 

                    MATIAS HACKETT      Next Of Kin         -

[place]  [address] [phone]

 

                    MATIAS ESQUEDA       Next Of Kin         -

[place]  [address] [phone]

 

                    RETIRED 00    Next Of Kin         -

                          -, --  -                  [phone]

 

                RETIRED         Next Of Kin     Unknown         [phone]

 

                RE              Next Of Kin     Unknown         Unavailable

 

                NYAB            Next Of Kin     Unknown         Unavailable

 

                    JOSE MEDRANO Next Of Kin         [address] [facility] [address] [place] [address]  [id number] [phone]

 

                    ARI MEDRANO  Next Of [facility]         [address] [facility] [address] [place] [address]  [id number] [phone]

 

                YINMATIAS   [place], [place]   [phone]

[phone]







Care Team Providers





                    Care Team Member Name Role                Phone

 

                    ALVAREZ,  THERESA   Unavailable         Unavailable

 

                    ALVAREZ,  THERESA   Unavailable         Unavailable

 

                    ALVAREZ,  THERESA   Unavailable         Unavailable

 

                    ALVAREZ,  THERESA   Unavailable         Unavailable

 

                    ALVAREZ,  THERESA   Unavailable         Unavailable

 

                    ALVAREZ,  THERESA   Unavailable         Unavailable

 

                    ALVAREZ,  THERESA   Unavailable         Unavailable

 

                    ALVAREZ,  THERESA   Unavailable         Unavailable

 

                    ALVAREZ,  THERESA   Unavailable         Unavailable

 

                    ALVAREZ,  THERESA   Unavailable         Unavailable

 

                    ALVAREZ,  THERESA   Unavailable         Unavailable

 

                    ALVAREZ,  THERESA   Unavailable         Unavailable

 

                    ALVAREZ,  THERESA   Unavailable         Unavailable

 

                    ALVAREZ,  THERESA   Unavailable         Unavailable

 

                    ALVAREZ,  THERESA   Unavailable         Unavailable

 

                    ALVAREZ,  THERESA   Unavailable         Unavailable

 

                    ALVAREZ,  THERESA   Unavailable         Unavailable

 

                    ALVAREZ,  THERESA   Unavailable         Unavailable

 

                    ALVAREZ,  THERESA   Unavailable         Unavailable

 

                    ALVAREZ,  THERESA   Unavailable         Unavailable

 

                    ALVAREZ,  THERESA   Unavailable         Unavailable

 

                    ALVAREZ,  THERESA   Unavailable         Unavailable

 

                    ALVAREZ,  THERESA   Unavailable         Unavailable

 

                    ALVAREZ,  THERESA   Unavailable         Unavailable

 

                    ALVAREZ,  THERESA   Unavailable         Unavailable



                                  



Re-disclosure Warning

          The records that you are about to access may contain information from 
federally-assisted alcohol or drug abuse programs. If such information is 
present, then the following federally mandated warning applies: This information
has been disclosed to you from records protected by federal confidentiality 
rules (42 CFR part 2). The federal rules prohibit you from making any further 
disclosure of this information unless further disclosure is expressly permitted 
by the written consent of the person to whom it pertains or as otherwise 
permitted by 42 CFR part 2. A general authorization for the release of medical 
or other information is NOT sufficient for this purpose. The Federal rules 
restrict any use of the information to criminally investigate or prosecute any 
alcohol or drug abuse patient.The records that you are about to access may 
contain highly sensitive health information, the redisclosure of which is 
protected by Article 27-F of the [facility] Public Health law. If you 
continue you may have access to information: Regarding HIV / AIDS; Provided by 
facilities licensed or operated by the [facility] Office of Mental Health; 
or Provided by the [facility] Office for People With Developmental 
Disabilities. If such information is present, then the following [place] 
mandated warning applies: This information has been disclosed to you from 
confidential records which are protected by state law. State law prohibits you 
from making any further disclosure of this information without the specific 
written consent of the person to whom it pertains, or as otherwise permitted by 
law. Any unauthorized further disclosure in violation of state law may result in
a fine or intermediate sentence or both. A general authorization for the release of 
medical or other information is NOT sufficient authorization for further disc
losure.                                                                         
    



Allergies and Adverse Reactions

          



           Type       Description Substance  Reaction   Status     Data Source(s

)

 

           Drug allergy Amlodipine Amlodipine Unknown    Active     eCW1 ([facility])



                                                                                
       



Family History

          



             Family Member Name Family Member Gender Family Member Status Date o

f Status 

Description                             Data Source(s)

 

           Unknown    Unknown    Problem                          [facility] ([facility]



                                                                                
       



Encounters

          



           Encounter  Providers  Location   Date       Indications Data Source(s

)

 

                Unknown                         [address] [facility]

[place] [address] 10/28/2020 12:00:00 AM 

EDT                                                 eCW1 ([facility])

 

                Unknown                         [address] [facility]

[place] [address] 10/13/2020 12:00:00 AM 

EDT                                                 eCW1 ([facility])

 

                Outpatient                      [address] [facility]

[place] [address] 10/08/2020 12:00:00 AM

EDT                                                 eCW1 ([facility])

 

                Outpatient                      [address] [facility]

[place] [address] 2020 12:00:00 AM

EDT                                                 eCW1 ([facility])

 

                Unknown                         [address] [facility]

[place] [address] 2020 12:00:00 AM 

EDT                                                 eCW1 ([facility])

 

                [facility]                      [address] [facility]

[place] [address] 2020 12:00:00 AM

EST                                                 eCW1 ([facility])

 

           Outpatient Referrer: THERESA ALVAREZ            2019 09:55:00 P

M EST            [facility]



                                                                                
                                                                   



Immunizations

          



             Vaccine      Date         Status       Description  Data Source(s)

 

                          influenza, recombinant, quadrIvalent,injectable, prese

rvative free 10/08/2020 

02:50:00 PM EDT     completed                               eCW1 ([facility])

 

                          influenza, recombinant, quadrIvalent,injectable, prese

rvative free 10/08/2020 

02:50:00 PM EDT     completed                               eCW1 ([facility])

 

                          influenza, recombinant, quadrIvalent,injectable, prese

rvative free 10/08/2020 

02:50:00 PM EDT     completed                               eCW1 ([facility])

 

             Tdap         2020 03:14:00 PM EST completed                 e

CW1 ([facility])

 

             Tdap         2020 03:14:00 PM EST completed                 e

CW1 ([facility])

 

             Tdap         2020 03:14:00 PM EST completed                 e

CW1 ([facility])

 

             Tdap         2020 03:14:00 PM EST completed                 e

CW1 ([facility])

 

             Tdap         2020 03:14:00 PM EST completed                 e

CW1 ([facility])

 

             Tdap         2020 03:14:00 PM EST completed                 e

CW1 ([facility])



                                                                                
                                                                                
      



Medications

          



          Medication Brand Name Start Date Product Form Dose      Route     Admi

nistrative 

Instructions Pharmacy Instructions Status     Indications Reaction   Description

 Data 

Source(s)

 

          2.5 mg              10/27/2020 12:00:00 AM EDT tablet    30           

       TAKE ONE TABLET BY MOUTH ONCE 

DAILY      TAKE ONE TABLET BY MOUTH ONCE DAILY SOLD: 10/27/2020                 

                 [facility]

 

          10 mg               2020 12:00:00 AM EDT tablet    90           

       TAKE ONE TABLET BY MOUTH EVERY 

DAY        TAKE ONE TABLET BY MOUTH EVERY DAY SOLD: 2020                  

                [facility]

 

          15 mg               2020 12:00:00 AM EDT tablet    90           

       TAKE ONE TABLET BY MOUTH EVERY 

EVENING    TAKE ONE TABLET BY MOUTH EVERY EVENING SOLD: 2020              

                    [facility]

 

          12.5 mg             2020 12:00:00 AM EDT capsule   90           

       TAKE ONE CAPSULE BY MOUTH 

EVERY MORNING TAKE ONE CAPSULE BY MOUTH EVERY MORNING SOLD: 2020          

                        

[facility]

 

          12.5 mg             2020 12:00:00 AM EDT capsule   90           

       TAKE ONE CAPSULE BY MOUTH 

EVERY MORNING TAKE ONE CAPSULE BY MOUTH EVERY MORNING SOLD: 2020          

                        

[facility]

 

          10 mg               2020 12:00:00 AM EST tablet    90           

       TAKE ONE TABLET BY MOUTH EVERY 

DAY        TAKE ONE TABLET BY MOUTH EVERY DAY SOLD: 03/10/2020                  

                [facility]

 

          10 mg               2020 12:00:00 AM EST tablet    90           

       TAKE ONE TABLET BY MOUTH EVERY 

DAY        TAKE ONE TABLET BY MOUTH EVERY DAY SOLD: 2020                  

                [facility]

 

          12.5 mg             2019 12:00:00 AM EST capsule   90           

       TAKE ONE CAPSULE BY MOUTH 

EVERY MORNING TAKE ONE CAPSULE BY MOUTH EVERY MORNING SOLD: 03/10/2020          

                        

[facility]

 

          12.5 mg             2019 12:00:00 AM EST capsule   90           

       TAKE ONE CAPSULE BY MOUTH 

EVERY MORNING TAKE ONE CAPSULE BY MOUTH EVERY MORNING SOLD: 2019          

                        

[facility]

 

          10 mg               2019 12:00:00 AM EDT tablet    90           

       TAKE ONE TABLET BY MOUTH ONCE A 

DAY        TAKE ONE TABLET BY MOUTH ONCE A DAY SOLD: 2019                 

                 [facility]



                                                                                
                                                                             



Insurance Providers

          



             Payer name   Policy type / Coverage type Policy ID    Covered party

 ID Covered 

party's relationship to mai Policy Mai             Plan Information

 

          MEDICARE            [id number]           SP                  [id number]

R09

 

          [facility]           [id number]           SP             

     [id number]

 

          [employer]           [id number]           SP           

       [id number]

 

          MEDICARE  C         [id number]           S                   [id number]

R09

 

          [employer]      O         [id number]           S                   [id number]

[id number]

 

                    [employer] [id number]        

                       

[id number]

 

                    ANSI-Medicare Part B [id number]   

                            

[id number]

 

                    [employer] [id number]        

                       

[id number]

 

                    ANSI-Medicare Part B [id number]   

                            

[id number]

 

                    [employer] [id number]        

                       

[id number]

 

                    ANSI-Medicare Part B [id number]   

                            

[id number]

 

                    ANSI-Medicare Part B [id number]   

                            

[id number]

 

                    [employer] [id number]        

                       

[id number]

 

                    ANSI-Medicare Part B [id number]   

                            

[id number]

 

                    [employer] [id number]        

                       

[id number]

 

                    [employer] [id number]        

                       

[id number]

 

                    ANSI-Medicare Part B [id number]   

                            

[id number]

 

                    ANSI-Medicare Part B [id number]   

                            

[id number]

 

                    [employer]mercial [id number]        

                       

[id number]

 

          MEDICARE            [id number]           SP                  [id number]

A

 

          [employer] Medigap Part B                     Self      

           

 

          Medicare [facility] Medicare Primary                     Self            

     

 

          MEDICARE  C         [id number]           S                   [id number]

A

 

          [employer]           [id number]           SP                  [id number]

98

 

          AARP      S         UNAVAILABLE           S                   UNAVAILA

BLE

 

          MEDICARE  P         UNAVAILABLE           S                   UNAVAILA

BLE

 

          MEDICARE                    -O/P           [id number]           18    

              [id number]



                                                                                
                                                                                
                                                                                
                                                                                
                        



Problems, Conditions, and Diagnoses

          



           Code       Display Name Description Problem Type Effective Dates Data

 Source(s)

 

             97595029     Essential hypertension Essential hypertension Problem 

     10/28/2020 

12:00:00 AM EDT                         [facility] ([facility] )

 

             N18.3        802515041    CKD (chronic kidney disease) stage 3, GFR

 30-59 ml/min Problem      

2020 12:00:00 AM EST              eCW1 ([facility])

 

             N18.3        [id number]    CKD (chronic kidney disease), stage III P

roblem      2020 

12:00:00 AM EST                         eCW1 ([facility])

 

             N18.3        [id number]    CKD (chronic kidney disease), stage III P

roblem      2020 

12:00:00 AM EST                         eCW1 ([facility])



                                                                                
                                               



Surgeries/Procedures

          



             Procedure    Description  Date         Indications  Data Source(s)

 

                    Colonoscopy Flexible Proximal To Splenic Flexure W/Biopsy Si

ngle/                     10/15/2020 

12:00:00 AM EDT                                     [facility] ([facility]

 

                    Immunization: Flublok Quadrivalent (18 years & older) 0.5mL 

IM (Influenza)                     

10/08/2020 12:00:00 AM EDT                           eCW1 ([facility])

 

             Office Visit, Est Pt., Level 3 PC              2020 12:00:00 

AM EST              eCW1 ([facility])

 

             Office Visit, Est Pt., Level 3 FC              2020 12:00:00 

AM EST              eCW1 ([facility])

 

             TDAP 0.5mL ([facility])              2020 12:00:00 AM EST      

        eCW1 ([facility])

 

             IMMUNIZATION ADMIN              2020 12:00:00 AM EST         

     eCW1 ([facility])



                                                                                
                                                         



Results

          



                    ID                  Date                Data Source

 

                    [id number]         2021 09:00:00 AM EST [facility]









          Name      Value     Range     Interpretation Code Description Data Karina

rce(s) Supporting 

Document(s)

 

          SARS coronavirus 2 RNA Not Detected                               [facility]

[place]     

 

                                        This lab was ordered by [facility] and reported by [facility]. 









                    ID                  Date                Data Source

 

                    [id number]         2021 11:00:00 AM EST [facility]









          Name      Value     Range     Interpretation Code Description Data Karina

rce(s) Supporting 

Document(s)

 

          SARS coronavirus 2 RNA Not Detected                               [facility]

[place]     

 

                                        This lab was ordered by [facility] and reported by [facility]. 









                    ID                  Date                Data Source

 

                    [id number]             2021 07:30:00 AM EST [facility]









          Name      Value     Range     Interpretation Code Description Data Karina

rce(s) Supporting 

Document(s)

 

                                        SARS coronavirus 2 RNA [Presence] in Res

piratory specimen by [facility] with probe 

detection                                              [facility]      

 

                                        This lab was ordered by [facility] a

nd reported by [facility].

 









                    ID                  Date                Data Source

 

                    [id number]         2021 12:00:00 AM EST [facility]









          Name      Value     Range     Interpretation Code Description Data Karina

rce(s) Supporting 

Document(s)

 

          SARS-CoV2 Rapid Antigen                                         [facility]

     

 

                                        This lab was ordered by [facility] and reported by [facility]. 









                    ID                  Date                Data Source

 

                    [id number]         2020 09:00:00 AM EST [facility]









          Name      Value     Range     Interpretation Code Description Data Karina

rce(s) Supporting 

Document(s)

 

          SARS coronavirus 2 RNA                                         [facility] 

    

 

                                        This lab was ordered by [facility] and reported by [facility]. 









                    ID                  Date                Data Source

 

                    [id number]             2020 09:15:00 AM EST [facility]









          Name      Value     Range     Interpretation Code Description Data Karina

rce(s) Supporting 

Document(s)

 

                                        SARS coronavirus 2 RNA [Presence] in Res

piratory specimen by [facility] with probe 

detection                                              [facility]      

 

                                        This lab was ordered by [facility] a

nd reported by [facility].

 









                    ID                  Date                Data Source

 

                    [id number]         2020 12:30:00 PM EST [facility]









          Name      Value     Range     Interpretation Code Description Data Karina

rce(s) Supporting 

Document(s)

 

          SARS coronavirus 2 RNA                                         [facility] 

    

 

                                        This lab was ordered by [facility] and reported by [facility]. 









                    ID                  Date                Data Source

 

                    [id number]         2020 07:30:00 AM EST [facility]









          Name      Value     Range     Interpretation Code Description Data Karina

rce(s) Supporting 

Document(s)

 

          SARS coronavirus 2 RNA                                         [facility] 

    

 

                                        This lab was ordered by [facility] and reported by [facility]. 









                    ID                  Date                Data Source

 

                    [id number]         2020 06:15:00 AM EST [facility]









          Name      Value     Range     Interpretation Code Description Data Karina

rce(s) Supporting 

Document(s)

 

          SARS coronavirus 2 RNA                                         [facility] 

    

 

                                        This lab was ordered by [facility] and reported by [facility]. 









                    ID                  Date                Data Source

 

                    [id number]             2020 04:59:00 PM EST [facility]









          Name      Value     Range     Interpretation Code Description Data Karina

rce(s) Supporting 

Document(s)

 

                                        SARS coronavirus 2 RNA [Presence] in Res

piratory specimen by [facility] with probe 

detection                                              [facility]      

 

                                        This lab was ordered by [facility] a

nd reported by [facility].

 









                    ID                  Date                Data Source

 

                    [id number]         2020 11:00:00 AM EST [facility]









          Name      Value     Range     Interpretation Code Description Data Karina

rce(s) Supporting 

Document(s)

 

          SARS coronavirus 2 RNA                                         [facility]

    

 

                                        This lab was ordered by [facility] and reported by [facility]. 









                    ID                  Date                Data Source

 

                    LIPID PANEL (CARDIAC RISK) 10/13/2020 07:52:00 AM EDT eCW1 (

[facility])









          Name      Value     Range     Interpretation Code Description Data Karina

rce(s) Supporting 

Document(s)

 

             Triglyceride [Mass/volume] in Serum or Plasma by calculation 164   

                                 

TRIGLYCERIDES LEVEL       eCW1 ([facility])  

 

           Cholesterol [Moles/volume] in Serum or Plasma 207                    

          CHOLESTEROL LEVEL eCW1 

([facility])         

 

             Cholesterol in LDL [Mass/volume] in Serum or Plasma by calculation 

129                                    LDL 

CHOLESTEROL               eCW1 ([facility])  

 

                    162                           NON-HDL-C eCW1 ([facility])  

 

           Cholesterol in HDL [Moles/volume] in Serum or Plasma 45              

                 HDL CHOLESTEROL eCW1

 ([facility])        

 

                    4.600                         CHOLESTEROL RISK RATIO eCW1 (Counts include 234 beds at the [facility])  









                    ID                  Date                Data Source

 

                    TSH                 10/13/2020 07:52:00 AM EDT eCW1 ([facility])









          Name      Value     Range     Interpretation Code Description Data Karina

rce(s) Supporting 

Document(s)

 

                    7.520                         THYROID STIMULATING HORMONE eC

W1 ([facility])  









                    ID                  Date                Data Source

 

                    ERYTHROCYTE SEDIMENTATION RATE 10/13/2020 07:52:00 AM EDT eC

W1 ([facility])









          Name      Value     Range     Interpretation Code Description Data Karina

rce(s) Supporting 

Document(s)

 

                    126                           ERYTHROCYTE SEDIMENTATION RATE

 eCW1 ([facility])  









                    ID                  Date                Data Source

 

                    C REACTIVE PROTEIN QUANTITATIV (At [facility]) 10/13/2020 07:52

:00 AM EDT eCW1 

([facility])









          Name      Value     Range     Interpretation Code Description Data Karina

rce(s) Supporting 

Document(s)

 

                    5.11                          C REACTIVE PROTEIN QUANTITATIV

 eCW1 ([facility]) 

 









                    ID                  Date                Data Source

 

                    Comprehensive Metabolic Profile (CMP) 10/13/2020 07:52:00 AM

 EDT eCW1 ([facility])









          Name      Value     Range     Interpretation Code Description Data Karina

rce(s) Supporting 

Document(s)

 

                    160                           GLUCOSE, FASTING eCW1 ([facility])  

 

                    2.88                          CREATININE FOR GFR eCW1 ([facility])  

 

                    54                            BLOOD UREA NITROGEN eCW1 ([facility])  

 

                    4.3                           POTASSIUM SERUM eCW1 ([facility])  

 

                    138                           SODIUM LEVEL eCW1 ([facility])  

 

                    22.5                          GLOMERULAR FILTRATION RATE eCW

1 ([facility])  

 

                    106                           CHLORIDE LEVEL eCW1 ([facility])  

 

                    21                            CARBON DIOXIDE LEVEL eCW1 ([facility])  

 

                    8.7                           CALCIUM LEVEL eCW1 ([facility])  

 

                    11                            AST/SGOT  eCW1 ([facility])  

 

                    6.5                           TOTAL PROTEIN eCW1 ([facility])  

 

                    65                            ALKALINE PHOSPHATASE eCW1 ([facility])  

 

                    10                            ALT/SGPT  eCW1 ([facility])  

 

                    0.2                           BILIRUBIN,TOTAL eCW1 ([facility])  

 

                    0.7                           ALBUMIN/GLOBULIN RATIO eCW1 (Counts include 234 beds at the [facility])  

 

                    2.6                           ALBUMIN   eCW1 ([facility])  









                    ID                  Date                Data Source

 

                    CBC with Differential 10/13/2020 07:52:00 AM EDT eCW1 ([facility])









          Name      Value     Range     Interpretation Code Description Data Karina

rce(s) Supporting 

Document(s)

 

                    7.6                           WHITE BLOOD COUNT eCW1 ([facility])  

 

                    2.30                          RED BLOOD COUNT eCW1 ([facility])  

 

                    6.1                           HEMOGLOBIN eCW1 ([facility])  

 

                    20.3                          HEMATOCRIT eCW1 ([facility])  

 

                    88.3                          MEAN CORPUSCULAR VOLUME eCW1 (

[facility])  

 

                    26.5                          MEAN CORPUSCULAR HEMOGLOBIN eC

W1 ([facility])  

 

                    30.0                          MEAN CORPUSCULAR HGB CONC eCW1

 ([facility])  

 

                    19.1                          RED CELL DISTRIBUTION WIDTH eC

W1 ([facility])  

 

                    260                           PLATELET COUNT, AUTOMATED eCW1

 ([facility])  

 

                    66.3                          NEUTROPHILS % eCW1 ([facility])  

 

                    18.9                          LYMPH %   eCW1 ([facility])  

 

                    10.7                          MONO %    eCW1 ([facility])  

 

                    5.1                           NEUTROPHILS # eCW1 ([facility])  

 

                    1.4                           LYMPH #   eCW1 ([facility])  

 

                    0.3                           BASO %    eCW1 ([facility])  

 

                    3.0                           EOS %     eCW1 ([facility])  

 

                    0.2                           EOS #     eCW1 ([facility])  

 

                    0.8                           MONO #    eCW1 ([facility])  

 

                    0.0                           BASO #    eCW1 ([facility])  







                                        Procedure

 

                                          



                                                                                
                                                                                
                                                                                
                  



Social History

          



           Code       Duration   Value      Status     Description Data Source(s

)

 

           Smoking    10/08/2020 12:00:00 AM EDT Former Smoker completed  Former

 Smoker eCW1 

([facility])

 

           Smoking    10/08/2020 12:00:00 AM EDT Former Smoker completed  Former

 Smoker eCW1 

([facility])

 

           Smoking    10/08/2020 12:00:00 AM EDT Former Smoker completed  Former

 Smoker eCW1 

([facility])

 

           Smoking    2020 12:00:00 AM EDT Former Smoker completed  Former

 Smoker eCW1 

([facility])

 

           Smoking    2020 12:00:00 AM EDT Former Smoker completed  Former

 Smoker eCW1 

([facility])



                                                                                
                                                          



Vital Signs

          



                    ID                  Date                Data Source

 

                    UNK                                      









           Name       Value      Range      Interpretation Code Description Data

 Source(s)

 

           Body surface area Derived from formula 1.76 m2                       

   1.76 m2    [facility] ([facility])

 

           Body weight 63.504 kg                        63.504 kg  [facility] ([facility])

 

           [facility] [lb_av]                       [facility]

[facility] ([facility]

 

           Body mass index (BMI) [Ratio] 21.3 kg/m2                       21.3 k

g/m2 [facility] ([facility])

 

           Body weight 140.00 [lb_av]                       140.00 [facility]av] [facility]

[facility] ([facility])

 

           Body height 68 [in_i]                        68 [in_i]  [facility] ([facility])

 

                                        5'8" 

 

           Diastolic blood pressure 60 mm[Hg]                        60 mm[Hg]  

[facility] ([facility], )

 

           Systolic blood pressure 120 mm[Hg]                       120 mm[Hg] M

[facility] ([facility], )

 

           Diastolic blood pressure 72 mm[Hg]                        72 mm[Hg]  

eCW1 ([facility])

 

           Systolic blood pressure 148 mm[Hg]                       148 mm[Hg] e

CW1 ([facility])

 

           Body temperature 98.6 [degF]                       98.6 [degF] eCW1 (

[facility])

 

           Respiratory rate 18 /min                          18 /min    eCW1 ([facility])

 

           Heart rate 90 /min                          90 /min    eCW1 ([facility])

 

           Body mass index (BMI) [Ratio] 22.35 kg/m2                       22.35

 kg/m2 W1 ([facility])

 

           Body height 68 [in_i]                        68 [in_i]  eCW1 ([facility])

 

           Body weight 147 [lb_av]                       147 [lb_av] eCW1 ([facility])

 

           Diastolic blood pressure 75 mm[Hg]                        75 mm[Hg]  

eCW1 ([facility])

 

           Systolic blood pressure 154 mm[Hg]                       154 mm[Hg] e

CW1 ([facility])

 

           Body temperature 98.7 [degF]                       98.7 [degF] eCW1 (

[facility])

 

           Respiratory rate 18 /min                          18 /min    eCW1 ([facility])

 

           Heart rate 90 /min                          90 /min    eCW1 ([facility])

 

           Body mass index (BMI) [Ratio] 24.02 kg/m2                       24.02

 kg/m2 eCW1 ([facility])

 

           Body height 68 [in_i]                        68 [in_i]  eCW1 ([facility])

 

           Body weight 158 [lb_av]                       158 [lb_av] eCW1 ([facility])

 

           Diastolic blood pressure 84 mm[Hg]                        84 mm[Hg]  

eCW1 ([facility])

 

           Systolic blood pressure 188 mm[Hg]                       188 mm[Hg] e

CW1 ([facility])

 

           Body temperature 98.4 [degF]                       98.4 [degF] eCW1 (

[facility])

 

           Respiratory rate 18 /min                          18 /min    eCW1 ([facility])

 

           Heart rate 75 /min                          75 /min    eCW1 ([facility])

 

           Body mass index (BMI) [Ratio] 26.15 kg/m2                       26.15

 kg/m2 eCW1 ([facility])

 

           Body height 68 [in_us]                       68 [in_us] eCW1 ([facility])

 

           Body weight Measured 172 [lb_av]                       172 [lb_av] eC

W1 ([facility])

## 2021-01-19 NOTE — CCD
Summarization Of Episode

                             Created on: 2021



VIRGINIA MEDRANO

External Reference #: [id number]

: 1940

Sex: Male



Demographics





                          Address                   [address] [place]  [address]

 

                          Home Phone                [phone]

 

                          Preferred Language        English

 

                          Marital Status            

 

                          B[place]ai Affiliation     [place]

 

                          Race                      White

 

                          Ethnic Group              Not  or 





Author





                          Author                    [facility]

 

                          Organization              [facility]

 

                          Address                   Unknown

 

                          Phone                     Unavailable







Support





                Name            Relationship    Address         Phone

 

                    MATIAS HACKETT      Next Of Kin         -

[place]  [address] [phone]

 

                    MATIAS ESQUEDA       Next Of Kin         -

[place]  [address] [phone]

 

                    RETIRED 00    Next Of Kin         -

                          -, --  -                  [phone]

 

                RETIRED         Next Of Kin     Unknown         [phone]

 

                RE              Next Of Kin     Unknown         Unavailable

 

                [place]AB            Next Of Kin     Unknown         Unavailable

 

                    JOSE MEDRANO Next Of Kin         [address] [facility] [address] [place] [address]  [phone]

 

                    ARI MEDRANO  [facility] Of [facility]         [address] [facility] [address] [place] [address]  [phone]

 

                YINMATIAS   [place], [place]   [phone]

[phone]







Care Team Providers





                    Care Team Member Name Role                Phone

 

                    ALVAREZ,  THERESA   Unavailable         Unavailable

 

                    ALVAREZ,  THERESA   Unavailable         Unavailable

 

                    ALVAREZ,  THERESA   Unavailable         Unavailable

 

                    ALVAREZ,  THERESA   Unavailable         Unavailable

 

                    ALVAREZ,  THERESA   Unavailable         Unavailable

 

                    ALVAREZ,  THERESA   Unavailable         Unavailable

 

                    ALVAREZ,  THERESA   Unavailable         Unavailable

 

                    ALVAREZ,  THERESA   Unavailable         Unavailable

 

                    ALVAREZ,  THERESA   Unavailable         Unavailable

 

                    ALVAREZ,  THERESA   Unavailable         Unavailable

 

                    ALVAREZ,  THERESA   Unavailable         Unavailable

 

                    ALVAREZ,  THERESA   Unavailable         Unavailable

 

                    ALVAREZ,  THERESA   Unavailable         Unavailable

 

                    ALVAREZ,  THERESA   Unavailable         Unavailable

 

                    ALVAREZ,  THERESA   Unavailable         Unavailable

 

                    ALVAREZ,  THERESA   Unavailable         Unavailable

 

                    ALVAREZ,  THERESA   Unavailable         Unavailable

 

                    ALVAREZ,  THERESA   Unavailable         Unavailable

 

                    ALVAREZ,  THERESA   Unavailable         Unavailable

 

                    ALVAREZ,  THERESA   Unavailable         Unavailable

 

                    ALVAREZ,  THERESA   Unavailable         Unavailable

 

                    ALVAREZ,  THERESA   Unavailable         Unavailable

 

                    ALVAREZ,  THERESA   Unavailable         Unavailable

 

                    ALVAREZ,  THERESA   Unavailable         Unavailable

 

                    ALVAREZ,  THERESA   Unavailable         Unavailable



                                  



Re-disclosure Warning

          The records that you are about to access may contain information from 
federally-assisted alcohol or drug abuse programs. If such information is 
present, then the following federally mandated warning applies: This information
has been disclosed to you from records protected by federal confidentiality 
rules (42 CFR part 2). The federal rules prohibit you from making any further 
disclosure of this information unless further disclosure is expressly permitted 
by the written consent of the person to whom it pertains or as otherwise 
permitted by 42 CFR part 2. A general authorization for the release of medical 
or other information is NOT sufficient for this purpose. The Federal rules 
restrict any use of the information to criminally investigate or prosecute any 
alcohol or drug abuse patient.The records that you are about to access may 
contain highly sensitive health information, the redisclosure of which is 
protected by Article 27-F of the [facility] Public [facility] law. If you 
continue you may have access to information: Regarding HIV / AIDS; Provided by 
facilities licensed or operated by the [facility] Office of Mental Health; 
or Provided by the [facility] Office for People With Developmental 
Disabilities. If such information is present, then the following [place] 
mandated warning applies: This information has been disclosed to you from 
confidential records which are protected by state law. State law prohibits you 
from making any further disclosure of this information without the specific 
written consent of the person to whom it pertains, or as otherwise permitted by 
law. Any unauthorized further disclosure in violation of state law may result in
a fine or longterm sentence or both. A general authorization for the release of 
medical or other information is NOT sufficient authorization for further disc
losure.                                                                         
    



Allergies and Adverse Reactions

          



           Type       Description Substance  Reaction   Status     Data Source(s

)

 

           Drug allergy Amlodipine Amlodipine Unknown    Active     eCW1 ([facility])



                                                                                
       



Family History

          



             Family Member Name Family Member Gender Family Member Status Date o

f Status 

Description                             Data Source(s)

 

           Unknown    Unknown    Problem                          [facility] ([facility]



                                                                                
       



Encounters

          



           Encounter  Providers  Location   Date       Indications Data Source(s

)

 

                Unknown                         [address] [facility]

[place] [address] 10/28/2020 12:00:00 AM 

EDT                                                 eCW1 ([facility])

 

                Unknown                         [address] [facility]

[place] [address] 10/13/2020 12:00:00 AM 

EDT                                                 eCW1 ([facility])

 

                Outpatient                      [address] [facility]

[place] [address] 10/08/2020 12:00:00 AM

EDT                                                 eCW1 ([facility])

 

                Outpatient                      [address] [facility]

[place] [address] 2020 12:00:00 AM

EDT                                                 eCW1 ([facility])

 

                Unknown                         [address] [facility]

[place] [address] 2020 12:00:00 AM 

EDT                                                 eCW1 ([facility])

 

                [facility]                      [address] [facility]

[place] [address] 2020 12:00:00 AM

EST                                                 eCW1 ([facility])

 

           Outpatient Referrer: THERESA ALVAREZ            2019 09:55:00 P

M EST            [facility]



                                                                                
                                                                   



Immunizations

          



             Vaccine      Date         Status       Description  Data Source(s)

 

                          influenza, recombinant, quadrIvalent,injectable, prese

rvative free 10/08/2020 

02:50:00 PM EDT     completed                               eCW1 ([facility])

 

                          influenza, recombinant, quadrIvalent,injectable, prese

rvative free 10/08/2020 

02:50:00 PM EDT     completed                               eCW1 ([facility])

 

                          influenza, recombinant, quadrIvalent,injectable, prese

rvative free 10/08/2020 

02:50:00 PM EDT     completed                               eCW1 ([facility])

 

             Tdap         2020 03:14:00 PM EST completed                 e

CW1 ([facility])

 

             Tdap         2020 03:14:00 PM EST completed                 e

CW1 ([facility])

 

             Tdap         2020 03:14:00 PM EST completed                 e

CW1 ([facility])

 

             Tdap         2020 03:14:00 PM EST completed                 e

CW1 ([facility])

 

             Tdap         2020 03:14:00 PM EST completed                 e

CW1 ([facility])

 

             Tdap         2020 03:14:00 PM EST completed                 e

CW1 ([facility])



                                                                                
                                                                                
      



Medications

          



          Medication Brand Name Start Date Product Form Dose      Route     Admi

nistrative 

Instructions Pharmacy Instructions Status     Indications Reaction   Description

 Data 

Source(s)

 

          2.5 mg              10/27/2020 12:00:00 AM EDT tablet    30           

       TAKE ONE TABLET BY MOUTH ONCE 

DAILY      TAKE ONE TABLET BY MOUTH ONCE DAILY SOLD: 10/27/2020                 

                 [facility]

 

          10 mg               2020 12:00:00 AM EDT tablet    90           

       TAKE ONE TABLET BY MOUTH EVERY 

DAY        TAKE ONE TABLET BY MOUTH EVERY DAY SOLD: 2020                  

                [facility]

 

          15 mg               2020 12:00:00 AM EDT tablet    90           

       TAKE ONE TABLET BY MOUTH EVERY 

EVENING    TAKE ONE TABLET BY MOUTH EVERY EVENING SOLD: 2020              

                    [facility]

 

          12.5 mg             2020 12:00:00 AM EDT capsule   90           

       TAKE ONE CAPSULE BY MOUTH 

EVERY MORNING TAKE ONE CAPSULE BY MOUTH EVERY MORNING SOLD: 2020          

                        

[facility]

 

          12.5 mg             2020 12:00:00 AM EDT capsule   90           

       TAKE ONE CAPSULE BY MOUTH 

EVERY MORNING TAKE ONE CAPSULE BY MOUTH EVERY MORNING SOLD: 2020          

                        

[facility]

 

          10 mg               2020 12:00:00 AM EST tablet    90           

       TAKE ONE TABLET BY MOUTH EVERY 

DAY        TAKE ONE TABLET BY MOUTH EVERY DAY SOLD: 03/10/2020                  

                [facility]

 

          10 mg               2020 12:00:00 AM EST tablet    90           

       TAKE ONE TABLET BY MOUTH EVERY 

DAY        TAKE ONE TABLET BY MOUTH EVERY DAY SOLD: 2020                  

                [facility]

 

          12.5 mg             2019 12:00:00 AM EST capsule   90           

       TAKE ONE CAPSULE BY MOUTH 

EVERY MORNING TAKE ONE CAPSULE BY MOUTH EVERY MORNING SOLD: 03/10/2020          

                        

[facility]

 

          12.5 mg             2019 12:00:00 AM EST capsule   90           

       TAKE ONE CAPSULE BY MOUTH 

EVERY MORNING TAKE ONE CAPSULE BY MOUTH EVERY MORNING SOLD: 2019          

                        

[facility]

 

          10 mg               2019 12:00:00 AM EDT tablet    90           

       TAKE ONE TABLET BY MOUTH ONCE A 

DAY        TAKE ONE TABLET BY MOUTH ONCE A DAY SOLD: 2019                 

                 [facility]



                                                                                
                                                                             



Insurance Providers

          



             Payer name   Policy type / Coverage type Policy ID    Covered party

 ID Covered 

party's relationship to mai Policy Mai             Plan Information

 

          MEDICARE            [id number]           SP                  [id number]

R09

 

          [facility]           [id number]           SP             

     [id number]

 

          [employer]           [id number]           SP           

       [id number]

 

          MEDICARE  C         [id number]           S                   [id number]

R09

 

          [employer]      O         [id number]           S                   [id number]

[id number]

 

                    [employer] [id number]        

                       

[id number]

 

                    ANSI-Medicare Part B [id number]   

                            

[id number]

 

                    AN[employer]-Commercial [id number]        

                       

[id number]

 

                    ANSI-Medicare Part B [id number]   

                            

[id number]

 

                    ANSI-Commercial [id number]        

                       

[id number]

 

                    ANSI-Medicare Part B [id number]   

                            

[id number]

 

                    ANSI-Medicare Part B [id number]   

                            

[id number]

 

                    AN[employer]-Commercial [id number]        

                       

[id number]

 

                    ANSI-Medicare Part B [id number]   

                            

[id number]

 

                    AN[employer]-Commercial [id number]        

                       

[id number]

 

                    ANSI-Commercial [id number]        

                       

[id number]

 

                    ANSI-Medicare Part B [id number]   

                            

[id number]

 

                    ANSI-Medicare Part B [id number]   

                            

[id number]

 

                    ANSI-Commercial [id number]        

                       

[id number]

 

          MEDICARE            [id number]           SP                  [id number]

A

 

          [employer] Medigap Part B                     Self      

           

 

          Medicare [facility] Medicare Primary                     Self            

     

 

          MEDICARE  C         [id number]           S                   [id number]

A

 

          [employer]           [id number]           SP                  [id number]

98

 

          AARP      S         UNAVAILABLE           S                   UNAVAILA

BLE

 

          MEDICARE  P         UNAVAILABLE           S                   UNAVAILA

BLE

 

          MEDICARE                    -O/P           [id number]           18    

              [id number]



                                                                                
                                                                                
                                                                                
                                                                                
                        



Problems, Conditions, and Diagnoses

          



           Code       Display Name Description Problem Type Effective Dates Data

 Source(s)

 

             15645087     Essential hypertension Essential hypertension Problem 

     10/28/2020 

12:00:00 AM EDT                         [facility] ([facility] )

 

             N18.3        826484759    CKD (chronic kidney disease) stage 3, GFR

 30-59 ml/min Problem      

2020 12:00:00 AM EST              eCW1 ([facility])

 

             N18.3        [id number]    CKD (chronic kidney disease), stage III P

roblem      2020 

12:00:00 AM EST                         eCW1 ([facility])

 

             N18.3        [id number]    CKD (chronic kidney disease), stage III P

roblem      2020 

12:00:00 AM EST                         eCW1 ([facility])



                                                                                
                                               



Surgeries/Procedures

          



             Procedure    Description  Date         Indications  Data Source(s)

 

                    Colonoscopy Flexible Proximal To Splenic Flexure W/Biopsy Si

ngle/                     10/15/2020 

12:00:00 AM EDT                                     [facility]

 

                    Immunization: Flublok Quadrivalent (18 years & older) 0.5mL 

IM (Influenza)                     

10/08/2020 12:00:00 AM EDT                           [facility]

 

             Office Visit, Est Pt., Level 3 PC              2020 12:00:00 

AM EST              eCW1 ([facility])

 

             Office Visit, Est Pt., Level 3 FC              2020 12:00:00 

AM EST              eCW1 ([facility])

 

             TDAP 0.5mL ([facility])              2020 12:00:00 AM EST      

        eCW1 ([facility])

 

             IMMUNIZATION ADMIN              2020 12:00:00 AM EST         

     eCW1 ([facility])



                                                                                
                                                         



Results

          



                    ID                  Date                Data Source

 

                    [id number]         2021 09:00:00 AM EST [facility]









          Name      Value     Range     Interpretation Code Description Data Karina

rce(s) Supporting 

Document(s)

 

          SARS coronavirus 2 RNA Not Detected                               [facility]

[place]     

 

                                        This lab was ordered by [facility] and reported by [facility]. 









                    ID                  Date                Data Source

 

                    [id number]         2021 11:00:00 AM EST [facility]









          Name      Value     Range     Interpretation Code Description Data Karina

rce(s) Supporting 

Document(s)

 

          SARS coronavirus 2 RNA Not Detected                               [facility]

[place]     

 

                                        This lab was ordered by [facility] and reported by [facility]. 









                    ID                  Date                Data Source

 

                    [id number]             2021 07:30:00 AM EST [facility]









          Name      Value     Range     Interpretation Code Description Data Karina

rce(s) Supporting 

Document(s)

 

                                        SARS coronavirus 2 RNA [Presence] in Res

piratory specimen by RAD[facility] with probe 

detection                                              [facility]      

 

                                        This lab was ordered by [facility] a

nd reported by [facility].

 









                    ID                  Date                Data Source

 

                    [id number]         2021 12:00:00 AM EST [facility]









          Name      Value     Range     Interpretation Code Description Data Karina

rce(s) Supporting 

Document(s)

 

          SARS-CoV2 Rapid Antigen                                         [facility]

     

 

                                        This lab was ordered by [facility] and reported by [facility]. 









                    ID                  Date                Data Source

 

                    [id number]         2020 09:00:00 AM EST [facility]









          Name      Value     Range     Interpretation Code Description Data Karina

rce(s) Supporting 

Document(s)

 

          SARS coronavirus 2 RNA                                         [facility] 

    

 

                                        This lab was ordered by [facility] and reported by [facility]. 









                    ID                  Date                Data Source

 

                    [id number]             2020 09:15:00 AM EST [facility]









          Name      Value     Range     Interpretation Code Description Data Karina

rce(s) Supporting 

Document(s)

 

                                        SARS coronavirus 2 RNA [Presence] in Res

piratory specimen by [facility] with probe 

detection                                              [facility]      

 

                                        This lab was ordered by [facility] a

nd reported by [facility].

 









                    ID                  Date                Data Source

 

                    [id number]         2020 12:30:00 PM EST [facility]









          Name      Value     Range     Interpretation Code Description Data Karina

rce(s) Supporting 

Document(s)

 

          SARS coronavirus 2 RNA                                         [facility] 

    

 

                                        This lab was ordered by [facility] and reported by [facility]. 









                    ID                  Date                Data Source

 

                    [id number]         2020 07:30:00 AM EST [facility]









          Name      Value     Range     Interpretation Code Description Data Karina

rce(s) Supporting 

Document(s)

 

          SARS coronavirus 2 RNA                                         [facility] 

    

 

                                        This lab was ordered by [facility] and reported by [facility]. 









                    ID                  Date                Data Source

 

                    [id number]         2020 06:15:00 AM EST [facility]









          Name      Value     Range     Interpretation Code Description Data Karina

rce(s) Supporting 

Document(s)

 

          SARS coronavirus 2 RNA                                         [facility] 

    

 

                                        This lab was ordered by [facility] and reported by [facility]. 









                    ID                  Date                Data Source

 

                    [id number]             2020 04:59:00 PM EST [facility]









          Name      Value     Range     Interpretation Code Description Data Karina

rce(s) Supporting 

Document(s)

 

                                        SARS coronavirus 2 RNA [Presence] in Res

piratory specimen by [facility] with probe 

detection                                              [facility]      

 

                                        This lab was ordered by [facility] a

nd reported by [facility].

 









                    ID                  Date                Data Source

 

                    [id number]         2020 11:00:00 AM EST [facility]









          Name      Value     Range     Interpretation Code Description Data Karina

rce(s) Supporting 

Document(s)

 

          SARS coronavirus 2 RNA                                         [facility]

    

 

                                        This lab was ordered by [facility] and reported by [facility]. 









                    ID                  Date                Data Source

 

                    LIPID PANEL (CARDIAC RISK) 10/13/2020 07:52:00 AM EDT eCW1 (

[facility])









          Name      Value     Range     Interpretation Code Description Data Karina

rce(s) Supporting 

Document(s)

 

             Triglyceride [Mass/volume] in Serum or Plasma by calculation 164   

                                 

TRIGLYCERIDES LEVEL       eCW1 ([facility])  

 

           Cholesterol [Moles/volume] in Serum or Plasma 207                    

          CHOLESTEROL LEVEL eCW1 

([facility])         

 

             Cholesterol in LDL [Mass/volume] in Serum or Plasma by calculation 

129                                    LDL 

CHOLESTEROL               eCW1 ([facility])  

 

                    162                           NON-HDL-C eCW1 ([facility])  

 

           Cholesterol in HDL [Moles/volume] in Serum or Plasma 45              

                 HDL CHOLESTEROL eCW1

 ([facility])        

 

                    4.600                         CHOLESTEROL RISK RATIO eCW1 ([facility])  









                    ID                  Date                Data Source

 

                    TSH                 10/13/2020 07:52:00 AM EDT eCW1 ([facility])









          Name      Value     Range     Interpretation Code Description Data Karina

rce(s) Supporting 

Document(s)

 

                    7.520                         THYROID STIMULATING HORMONE eC

W1 ([facility])  









                    ID                  Date                Data Source

 

                    ERYTHROCYTE SEDIMENTATION RATE 10/13/2020 07:52:00 AM EDT eC

W1 ([facility])









          Name      Value     Range     Interpretation Code Description Data Karina

rce(s) Supporting 

Document(s)

 

                    126                           ERYTHROCYTE SEDIMENTATION RATE

 eCW1 ([facility])  









                    ID                  Date                Data Source

 

                    C REACTIVE PROTEIN QUANTITATIV ([facility]) 10/13/2020 07:52

:00 AM EDT eCW1 

([facility])









          Name      Value     Range     Interpretation Code Description Data Karina

rce(s) Supporting 

Document(s)

 

                    5.11                          C REACTIVE PROTEIN QUANTITATIV

 eCW1 ([facility]) 

 









                    ID                  Date                Data Source

 

                    Comprehensive Metabolic Profile (CMP) 10/13/2020 07:52:00 AM

 EDT eCW1 ([facility])









          Name      Value     Range     Interpretation Code Description Data Karina

rce(s) Supporting 

Document(s)

 

                    160                           GLUCOSE, FASTING eCW1 ([facility])  

 

                    2.88                          CREATININE FOR GFR eCW1 ([facility])  

 

                    54                            BLOOD UREA NITROGEN eCW1 ([facility])  

 

                    4.3                           POTASSIUM SERUM eCW1 ([facility])  

 

                    138                           SODIUM LEVEL eCW1 (Replaced by [facility])  

 

                    22.5                          GLOMERULAR FILTRATION RATE eCW

1 ([facility])  

 

                    106                           CHLORIDE LEVEL eCW1 ([facility])  

 

                    21                            CARBON DIOXIDE LEVEL eCW1 ([facility])  

 

                    8.7                           CALCIUM LEVEL eCW1 ([facility])  

 

                    11                            AST/SGOT  eCW1 ([facility])  

 

                    6.5                           TOTAL PROTEIN eCW1 ([facility])  

 

                    65                            ALKALINE PHOSPHATASE eCW1 ([facility])  

 

                    10                            ALT/SGPT  eCW1 ([facility])  

 

                    0.2                           BILIRUBIN,TOTAL eCW1 ([facility])  

 

                    0.7                           ALBUMIN/GLOBULIN RATIO eCW1 ([facility])  

 

                    2.6                           ALBUMIN   eCW1 ([facility])  









                    ID                  Date                Data Source

 

                    CBC with Differential 10/13/2020 07:52:00 AM EDT eCW1 ([facility])









          Name      Value     Range     Interpretation Code Description Data Karina

rce(s) Supporting 

Document(s)

 

                    7.6                           WHITE BLOOD COUNT eCW1 ([facility])  

 

                    2.30                          RED BLOOD COUNT eCW1 ([facility])  

 

                    6.1                           HEMOGLOBIN eCW1 ([facility])  

 

                    20.3                          HEMATOCRIT eCW1 ([facility])  

 

                    88.3                          MEAN CORPUSCULAR VOLUME eCW1 (

[facility])  

 

                    26.5                          MEAN CORPUSCULAR HEMOGLOBIN eC

W1 ([facility])  

 

                    30.0                          MEAN CORPUSCULAR HGB CONC eCW1

 ([facility])  

 

                    19.1                          RED CELL DISTRIBUTION WIDTH eC

W1 ([facility])  

 

                    260                           PLATELET COUNT, AUTOMATED eCW1

 ([facility])  

 

                    66.3                          NEUTROPHILS % eCW1 ([facility])  

 

                    18.9                          LYMPH %   eCW1 ([facility])  

 

                    10.7                          MONO %    eCW1 ([facility])  

 

                    5.1                           NEUTROPHILS # eCW1 ([facility])  

 

                    1.4                           LYMPH #   eCW1 ([facility])  

 

                    0.3                           BASO %    eCW1 ([facility])  

 

                    3.0                           EOS %     eCW1 ([facility])  

 

                    0.2                           EOS #     eCW1 ([facility])  

 

                    0.8                           MONO #    eCW1 ([facility])  

 

                    0.0                           BASO #    eCW1 ([facility])  







                                        Procedure

 

                                          



                                                                                
                                                                                
                                                                                
                  



Social History

          



           Code       Duration   Value      Status     Description Data Source(s

)

 

           Smoking    10/08/2020 12:00:00 AM EDT Former Smoker completed  Former

 Smoker eCW1 

([facility])

 

           Smoking    10/08/2020 12:00:00 AM EDT Former Smoker completed  Former

 Smoker eCW1 

([facility])

 

           Smoking    10/08/2020 12:00:00 AM EDT Former Smoker completed  Former

 Smoker eCW1 

([facility])

 

           Smoking    2020 12:00:00 AM EDT Former Smoker completed  Former

 Smoker eCW1 

([facility])

 

           Smoking    2020 12:00:00 AM EDT Former Smoker completed  Former

 Smoker eCW1 

([facility])



                                                                                
                                                          



Vital Signs

          



                    ID                  Date                Data Source

 

                    UNK                                      









           Name       Value      Range      Interpretation Code Description Data

 Source(s)

 

           Body surface area Derived from formula 1.76 m2                       

   1.76 m2    [facility] ([facility])

 

           Body weight 63.504 kg                        63.504 kg  [facility] ([facility])

 

           [facility] [[facility]av]                       [facility] ([facility]

 

           Body mass index (BMI) [Ratio] 21.3 kg/m2                       21.3 k

g/m2 [facility] ([facility])

 

           Body weight 140.00 [lb_av]                       140.00 [facility]

[facility] ([facility]

 

           Body height 68 [in_i]                        68 [in_i]  [facility] ([facility])

 

                                        5'8" 

 

           Diastolic blood pressure 60 mm[Hg]                        60 mm[Hg]  

[facility] ([facility], )

 

           Systolic blood pressure 120 mm[Hg]                       120 mm[Hg] M

[facility] ([facility], )

 

           Diastolic blood pressure 72 mm[Hg]                        72 mm[Hg]  

eCW1 ([facility])

 

           Systolic blood pressure 148 mm[Hg]                       148 mm[Hg] e

CW1 ([facility])

 

           Body temperature 98.6 [degF]                       98.6 [degF] eCW1 (

[facility])

 

           Respiratory rate 18 /min                          18 /min    eCW1 ([facility])

 

           Heart rate 90 /min                          90 /min    eCW1 ([facility])

 

           Body mass index (BMI) [Ratio] 22.35 kg/m2                       22.35

 kg/m2 W1 ([facility])

 

           Body height 68 [in_i]                        68 [in_i]  eCW1 ([facility])

 

           Body weight 147 [lb_av]                       147 [lb_av] eCW1 ([facility])

 

           Diastolic blood pressure 75 mm[Hg]                        75 mm[Hg]  

eCW1 ([facility])

 

           Systolic blood pressure 154 mm[Hg]                       154 mm[Hg] e

CW1 ([facility])

 

           Body temperature 98.7 [degF]                       98.7 [degF] eCW1 (

[facility])

 

           Respiratory rate 18 /min                          18 /min    eCW1 ([facility])

 

           Heart rate 90 /min                          90 /min    eCW1 ([facility])

 

           Body mass index (BMI) [Ratio] 24.02 kg/m2                       24.02

 kg/m2 eCW1 ([facility])

 

           Body height 68 [in_i]                        68 [in_i]  eCW1 ([facility])

 

           Body weight 158 [lb_av]                       158 [lb_av] eCW1 ([facility])

 

           Diastolic blood pressure 84 mm[Hg]                        84 mm[Hg]  

eCW1 ([facility])

 

           Systolic blood pressure 188 mm[Hg]                       188 mm[Hg] e

CW1 ([facility])

 

           Body temperature 98.4 [degF]                       98.4 [degF] eCW1 (

[facility])

 

           Respiratory rate 18 /min                          18 /min    eCW1 ([facility])

 

           Heart rate 75 /min                          75 /min    eCW1 ([facility])

 

           Body mass index (BMI) [Ratio] 26.15 kg/m2                       26.15

 kg/m2 eCW1 ([facility])

 

           Body height 68 [in_us]                       68 [in_us] eCW1 ([facility])

 

           Body weight Measured 172 [lb_av]                       172 [lb_av] eC

W1 ([facility])

## 2021-01-19 NOTE — CCD
Continuity of Care Document (CCD)

                             Created on: 2020



Gustavo Hanna

External Reference #: [id number]

: 1940

Sex: Male



Demographics





                          Address                   [facility] [address]

[place]  [address]

 

                          Home Phone                [phone]

 

                          Preferred Language        Unknown

 

                          Marital Status            Unknown

 

                          Buddhism Affiliation     Unknown

 

                          Race                      White

 

                          Ethnic Group              Not  or 





Author





                          Author                    Gustavo HAY M.D.

 

                          Organization              Unknown

 

                          Address                   [address]

[place]  [address]



 

                          Phone                     [phone]







Care Team Providers





                    Care Team Member Name Role                Phone

 

                    Jarrell Hood M.D.  AUTM                [phone]







Problems





                    Active Problems     Provider            Date

 

                    Essential hypertension Dionicio Hay M.D. Onset: 10/28/202

0







Social History





                Type            Date            Description     Comments

 

                Birth Sex                       Unknown          

 

                ETOH Use                        Denies alcohol use  

 

                Tobacco Use     Start: Unknown  Denies Smoking   

 

                Recreational Drug Use                 Denies Drug Use  







Allergies, Adverse Reactions, Alerts





             Active Allergies Reaction     Severity     Comments     Date

 

             Sulfamethoxazole                                        10/28/2020







Medications





           Active Medications SIG        Qnty       Indications Ordering Provide

r Date

 

                          Tylenol                     325mg Tablets             

      2 tab by mouth every

6 hours as needed for pain                                 Unknown         

 

             Remeron                     15mg Tablets                   every da

y                              Unknown

                                        

 

                          Vitamin B12                     1000mcg Tablets ER    

               1 by mouth 

every day                                       Unknown         

 

                          Milk Of Magnesia                     1200mg/15ML Suspe

nsion                   

take 1 dose as directed.                                 Unknown         

000

 

                          Dulcolax                     10mg Suppository         

          1 per rectum 

every day as directed                                 Unknown         

 

                          Zofran                     4mg Tablets                

   1 every 6 hours as 

needed nausea                                   Unknown         







Immunizations





                                        Description

 

                                        No Information Available







Vital Signs





                Date            Vital           Result          Comment

 

                2020  3:57pm BP Systolic     120 mmHg         

 

                    BP Diastolic        60 mmHg              

 

                    Height              68 inches           5'8"

 

                    Weight              140.00 lb            

 

                    BMI (Body Mass Index) 21.3 kg/m2           

 

                    Ideal Body Weight   154 lb               

 

                    Weight              63.504 kg            

 

                    BSA (Body Surface Area) 1.76 m2              







Results





                                        Description

 

                                        No Information Available







Procedures





                Date            Code            Description     Status

 

                    10/15/2020          35244               Colonoscopy Flexible

 Proximal To Splenic Flexure W/Biopsy 

Single/                                 Completed







Medical Devices





                                        Description

 

                                        No Information Available







Encounters





                                        Description

 

                                        No Information Available







Assessments





                Date            Code            Description     Provider

 

                10/15/2020      K62.5           Hemorrhage of anus and rectum Ro

anya Hay M.D.

 

                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

 to blood loss (chronic) Dionicio Hay M.D.







Plan of Treatment





No Information Available



Functional Status





                                        Description

 

                                        No Information Available







Mental Status





                                        Description

 

                                        No Information Available







Referrals





                                        Description

 

                                        No Information Available

## 2021-01-19 NOTE — CCD
Summarization Of Episode

                             Created on: 2021



VIRGINIA MEDRANO

External Reference #: [id number]

: 1940

Sex: Male



Demographics





                          Address                   [address] [place]  [address]

 

                          Home Phone                [phone]

 

                          Preferred Language        English

 

                          Marital Status            

 

                          Evangelical Affiliation     [place]

 

                          Race                      White

 

                          Ethnic Group              Not  or 





Author





                          Author                    [facility]

 

                          Organization              [facility]

 

                          Address                   Unknown

 

                          Phone                     Unavailable







Support





                Name            Relationship    Address         Phone

 

                    MATIAS HACKETT      Next Of Kin         -

[place]  [address] [phone]

 

                    MATIAS ESQUEDA       Next Of Kin         -

[place]  [address] [phone]

 

                    RETIRED 00    Next Of Kin         -

                          -, --  -                  [phone]

 

                RETIRED         Next Of Kin     Unknown         [phone]

 

                RE              Next Of Kin     Unknown         Unavailable

 

                NYAB            Next Of Kin     Unknown         Unavailable

 

                    JOSE MEDRANO Next Of Kin         [address] [facility] [address] [place] [address]  289[address] [phone]

 

                    ARI MEDRANO  Next Of Kin         [address] [facility] [address] [place] [address]  [phone]

 

                YINMATIAS   [place], [place]   [phone]

[phone]







Care Team Providers





                    Care Team Member Name Role                Phone

 

                    ALVAREZ,  THERESA   Unavailable         Unavailable

 

                    ALVAREZ,  THERESA   Unavailable         Unavailable

 

                    ALVAREZ,  THERESA   Unavailable         Unavailable

 

                    ALVAREZ,  THERESA   Unavailable         Unavailable

 

                    ALVAREZ,  THERESA   Unavailable         Unavailable

 

                    ALVAREZ,  THERESA   Unavailable         Unavailable

 

                    ALVAREZ,  THERESA   Unavailable         Unavailable

 

                    ALVAREZ,  THERESA   Unavailable         Unavailable

 

                    ALVAREZ,  THERESA   Unavailable         Unavailable

 

                    ALVAERZ,  THERESA   Unavailable         Unavailable

 

                    ALVAREZ,  THERESA   Unavailable         Unavailable

 

                    ALVAREZ,  THERESA   Unavailable         Unavailable

 

                    ALVAREZ,  THERESA   Unavailable         Unavailable

 

                    ALVAREZ,  THERESA   Unavailable         Unavailable

 

                    ALVAREZ,  THERESA   Unavailable         Unavailable

 

                    ALVAREZ,  THERESA   Unavailable         Unavailable

 

                    ALVAREZ,  THERESA   Unavailable         Unavailable

 

                    ALVAREZ,  THERESA   Unavailable         Unavailable

 

                    ALVAREZ,  THERESA   Unavailable         Unavailable

 

                    ALVAREZ,  THERESA   Unavailable         Unavailable

 

                    ALVAREZ,  THERESA   Unavailable         Unavailable

 

                    ALVAREZ,  THERESA   Unavailable         Unavailable

 

                    ALVAREZ,  THERESA   Unavailable         Unavailable

 

                    ALVAREZ,  THERESA   Unavailable         Unavailable

 

                    ALVAREZ,  THERESA   Unavailable         Unavailable



                                  



Re-disclosure Warning

          The records that you are about to access may contain information from 
federally-assisted alcohol or drug abuse programs. If such information is 
present, then the following federally mandated warning applies: This information
has been disclosed to you from records protected by federal confidentiality 
rules (42 CFR part 2). The federal rules prohibit you from making any further 
disclosure of this information unless further disclosure is expressly permitted 
by the written consent of the person to whom it pertains or as otherwise 
permitted by 42 CFR part 2. A general authorization for the release of medical 
or other information is NOT sufficient for this purpose. The Federal rules 
restrict any use of the information to criminally investigate or prosecute any 
alcohol or drug abuse patient.The records that you are about to access may 
contain highly sensitive health information, the redisclosure of which is 
protected by Article 27-F of the [facility] Public Health law. If you 
continue you may have access to information: Regarding HIV / AIDS; Provided by 
facilities licensed or operated by the [facility] Office of Mental Health; 
or Provided by the [facility] Office for People With Developmental 
Disabilities. If such information is present, then the following [place] 
mandated warning applies: This information has been disclosed to you from 
confidential records which are protected by state law. State law prohibits you 
from making any further disclosure of this information without the specific 
written consent of the person to whom it pertains, or as otherwise permitted by 
law. Any unauthorized further disclosure in violation of state law may result in
a fine or [facility] sentence or both. A general authorization for the release of 
medical or other information is NOT sufficient authorization for further disc
losure.                                                                         
    



Allergies and Adverse Reactions

          



           Type       Description Substance  Reaction   Status     Data Source(s

)

 

           Drug allergy Amlodipine Amlodipine Unknown    Active     eCW1 ([facility])



                                                                                
       



Family History

          



             Family Member Name Family Member Gender Family Member Status Date o

f Status 

Description                             Data Source(s)

 

           Unknown    Unknown    Problem                          [facility] ([facility]



                                                                                
       



Encounters

          



           Encounter  Providers  Location   Date       Indications Data Source(s

)

 

                Unknown                         [address] [facility] [place] [address] 10/28/2020 12:00:00 AM 

EDT                                                 eCW1 ([facility])

 

                Unknown                         [address] [facility] [address]

[place] [address] 10/13/2020 12:00:00 AM 

EDT                                                 eCW1 ([facility])

 

                Outpatient                      [address] [facility]

[place] [address] 10/08/2020 12:00:00 AM

EDT                                                 eCW1 ([facility])

 

                Outpatient                      [address] [facility] [place] [address] 2020 12:00:00 AM

EDT                                                 eCW1 ([facility])

 

                Unknown                         [address] [facility]

[place] [address] 2020 12:00:00 AM 

EDT                                                 eCW1 ([facility])

 

                [facility]                      [address] [facility] [place] [address] 2020 12:00:00 AM

EST                                                 eCW1 ([facility])

 

           Outpatient Referrer: THERESA ALVAREZ            2019 09:55:00 P

M EST            [facility]



                                                                                
                                                                   



Immunizations

          



             Vaccine      Date         Status       Description  Data Source(s)

 

                          influenza, recombinant, quadrIvalent,injectable, prese

rvative free 10/08/2020 

02:50:00 PM EDT     completed                               eCW1 ([facility])

 

                          influenza, recombinant, quadrIvalent,injectable, prese

rvative free 10/08/2020 

02:50:00 PM EDT     completed                               eCW1 ([facility])

 

                          influenza, recombinant, quadrIvalent,injectable, prese

rvative free 10/08/2020 

02:50:00 PM EDT     completed                               eCW1 ([facility])

 

             Tdap         2020 03:14:00 PM EST completed                 e

CW1 ([facility])

 

             Tdap         2020 03:14:00 PM EST completed                 e

CW1 ([facility])

 

             Tdap         2020 03:14:00 PM EST completed                 e

CW1 ([facility])

 

             Tdap         2020 03:14:00 PM EST completed                 e

CW1 ([facility])

 

             Tdap         2020 03:14:00 PM EST completed                 e

CW1 ([facility])

 

             Tdap         2020 03:14:00 PM EST completed                 e

CW1 ([facility])



                                                                                
                                                                                
      



Medications

          



          Medication Brand Name Start Date Product Form Dose      Route     Admi

nistrative 

Instructions Pharmacy Instructions Status     Indications Reaction   Description

 Data 

Source(s)

 

          2.5 mg              10/27/2020 12:00:00 AM EDT tablet    30           

       TAKE ONE TABLET BY MOUTH ONCE 

DAILY      TAKE ONE TABLET BY MOUTH ONCE DAILY SOLD: 10/27/2020                 

                 [facility]

 

          10 mg               2020 12:00:00 AM EDT tablet    90           

       TAKE ONE TABLET BY MOUTH EVERY 

DAY        TAKE ONE TABLET BY MOUTH EVERY DAY SOLD: 2020                  

                [facility]

 

          15 mg               2020 12:00:00 AM EDT tablet    90           

       TAKE ONE TABLET BY MOUTH EVERY 

EVENING    TAKE ONE TABLET BY MOUTH EVERY EVENING SOLD: 2020              

                    [facility]

 

          12.5 mg             2020 12:00:00 AM EDT capsule   90           

       TAKE ONE CAPSULE BY MOUTH 

EVERY MORNING TAKE ONE CAPSULE BY MOUTH EVERY MORNING SOLD: 2020          

                        

[facility]

 

          12.5 mg             2020 12:00:00 AM EDT capsule   90           

       TAKE ONE CAPSULE BY MOUTH 

EVERY MORNING TAKE ONE CAPSULE BY MOUTH EVERY MORNING SOLD: 2020          

                        

[facility]

 

          10 mg               2020 12:00:00 AM EST tablet    90           

       TAKE ONE TABLET BY MOUTH EVERY 

DAY        TAKE ONE TABLET BY MOUTH EVERY DAY SOLD: 03/10/2020                  

                [facility]

 

          10 mg               2020 12:00:00 AM EST tablet    90           

       TAKE ONE TABLET BY MOUTH EVERY 

DAY        TAKE ONE TABLET BY MOUTH EVERY DAY SOLD: 2020                  

                [facility]

 

          12.5 mg             2019 12:00:00 AM EST capsule   90           

       TAKE ONE CAPSULE BY MOUTH 

EVERY MORNING TAKE ONE CAPSULE BY MOUTH EVERY MORNING SOLD: 03/10/2020          

                        

[facility]

 

          12.5 mg             2019 12:00:00 AM EST capsule   90           

       TAKE ONE CAPSULE BY MOUTH 

EVERY MORNING TAKE ONE CAPSULE BY MOUTH EVERY MORNING SOLD: 2019          

                        

[facility]

 

          10 mg               2019 12:00:00 AM EDT tablet    90           

       TAKE ONE TABLET BY MOUTH ONCE A 

DAY        TAKE ONE TABLET BY MOUTH ONCE A DAY SOLD: 2019                 

                 [facility]



                                                                                
                                                                             



Insurance Providers

          



             Payer name   Policy type / Coverage type Policy ID    Covered party

 ID Covered 

party's relationship to mai Policy Mai             Plan Information

 

          MEDICARE            [id number]           SP                  [id number]

R09

 

          [facility]           [id number]           SP             

     [id number]

 

          [employer]           [id number]           SP           

       [id number]

 

          MEDICARE  C         [id number]           S                   [id number]

R09

 

          [employer]      O         [id number]           S                   [id number]

[id number]

 

                    [employer] [id number]        

                       

[id number]

 

                    ANSI-Medicare Part B [id number]   

                            

[id number]

 

                    [employer] [id number]        

                       

[id number]

 

                    ANSI-Medicare Part B [id number]   

                            

[id number]

 

                    [employer]mercial [id number]        

                       

[id number]

 

                    ANSI-Medicare Part B [id number]   

                            

[id number]

 

                    ANSI-Medicare Part B [id number]   

                            

[id number]

 

                    [employer] [id number]        

                       

[id number]

 

                    ANSI-Medicare Part B [id number]   

                            

[id number]

 

                    [employer]mercial [id number]        

                       

[id number]

 

                    AN[employer]mercial [id number]        

                       

[id number]

 

                    ANSI-Medicare Part B [id number]   

                            

[id number]

 

                    ANSI-Medicare Part B [id number]   

                            

[id number]

 

                    [employer]-Commercial [id number]        

                       

[id number]

 

          MEDICARE            [id number]           SP                  [id number]

A

 

          [employer] Medigap Part B                     Self      

           

 

          Medicare [facility] Medicare Primary                     Self            

     

 

          MEDICARE  C         [id number]           S                   [id number]

A

 

          [employer]           [id number]           SP                  [id number]

98

 

          AARP      S         UNAVAILABLE           S                   UNAVAILA

BLE

 

          MEDICARE  P         UNAVAILABLE           S                   UNAVAILA

BLE

 

          MEDICARE                    -O/P           [id number]           18    

              [id number]



                                                                                
                                                                                
                                                                                
                                                                                
                        



Problems, Conditions, and Diagnoses

          



           Code       Display Name Description Problem Type Effective Dates Data

 Source(s)

 

             97402502     Essential hypertension Essential hypertension Problem 

     10/28/2020 

12:00:00 AM EDT                         [facility] ([facility]

 

             N18.3        586444268    CKD (chronic kidney disease) stage 3, GFR

 30-59 ml/min Problem      

2020 12:00:00 AM EST              eCW1 ([facility])

 

             N18.3        [id number]    CKD (chronic kidney disease), stage III P

roblem      2020 

12:00:00 AM EST                         eCW1 ([facility])

 

             N18.3        [id number]    CKD (chronic kidney disease), stage III P

roblem      2020 

12:00:00 AM EST                         eCW1 ([facility])



                                                                                
                                               



Surgeries/Procedures

          



             Procedure    Description  Date         Indications  Data Source(s)

 

                    Colonoscopy Flexible Proximal To Splenic Flexure W/Biopsy Si

ngle/                     10/15/2020 

12:00:00 AM EDT                                     [facility] ([facility]

 

                    Immunization: Flublok Quadrivalent (18 years & older) 0.5mL 

IM (Influenza)                     

10/08/2020 12:00:00 AM EDT                           eCW1 ([facility])

 

             Office Visit, Est Pt., Level 3 PC              2020 12:00:00 

AM EST              eCW1 ([facility])

 

             Office Visit, Est Pt., Level 3 FC              2020 12:00:00 

AM EST              eCW1 ([facility])

 

             TDAP 0.5mL ([facility])              2020 12:00:00 AM EST      

        eCW1 ([facility])

 

             IMMUNIZATION ADMIN              2020 12:00:00 AM EST         

     eCW1 ([facility])



                                                                                
                                                         



Results

          



                    ID                  Date                Data Source

 

                    [id number]         2021 09:00:00 AM EST [facility]









          Name      Value     Range     Interpretation Code Description Data Karina

rce(s) Supporting 

Document(s)

 

          SARS coronavirus 2 RNA Not Detected                               [facility]

[place]     

 

                                        This lab was ordered by [facility] and reported by [facility]. 









                    ID                  Date                Data Source

 

                    [id number]         2021 11:00:00 AM EST [facility]









          Name      Value     Range     Interpretation Code Description Data Karina

rce(s) Supporting 

Document(s)

 

          SARS coronavirus 2 RNA Not Detected                               [facility]

[place]     

 

                                        This lab was ordered by [facility] and reported by [facility]. 









                    ID                  Date                Data Source

 

                    [id number]             2021 07:30:00 AM EST [facility]









          Name      Value     Range     Interpretation Code Description Data Karina

rce(s) Supporting 

Document(s)

 

                                        SARS coronavirus 2 RNA [Presence] in Res

piratory specimen by [facility] with probe 

detection                                              [facility]      

 

                                        This lab was ordered by [facility] a

nd reported by [facility].

 









                    ID                  Date                Data Source

 

                    [id number]         2021 12:00:00 AM EST [facility]









          Name      Value     Range     Interpretation Code Description Data Karina

rce(s) Supporting 

Document(s)

 

          SARS-CoV2 Rapid Antigen                                         [facility]

     

 

                                        This lab was ordered by [facility] and reported by [facility]. 









                    ID                  Date                Data Source

 

                    [id number]         2020 09:00:00 AM EST [facility]









          Name      Value     Range     Interpretation Code Description Data Karina

rce(s) Supporting 

Document(s)

 

          SARS coronavirus 2 RNA                                         [facility] 

    

 

                                        This lab was ordered by [facility] and reported by [facility]. 









                    ID                  Date                Data Source

 

                    [id number]             2020 09:15:00 AM EST [facility]









          Name      Value     Range     Interpretation Code Description Data Karina

rce(s) Supporting 

Document(s)

 

                                        SARS coronavirus 2 RNA [Presence] in Res

piratory specimen by [facility] with probe 

detection                                              [facility]      

 

                                        This lab was ordered by [facility] a

nd reported by [facility].

 









                    ID                  Date                Data Source

 

                    [id number]         2020 12:30:00 PM EST [facility]









          Name      Value     Range     Interpretation Code Description Data Karina

rce(s) Supporting 

Document(s)

 

          SARS coronavirus 2 RNA                                         [facility] 

    

 

                                        This lab was ordered by [facility] and reported by [facility]. 









                    ID                  Date                Data Source

 

                    [id number]         2020 07:30:00 AM EST [facility]









          Name      Value     Range     Interpretation Code Description Data Karina

rce(s) Supporting 

Document(s)

 

          SARS coronavirus 2 RNA                                         [facility] 

    

 

                                        This lab was ordered by [facility] and reported by [facility]. 









                    ID                  Date                Data Source

 

                    [id number]         2020 06:15:00 AM EST [facility]









          Name      Value     Range     Interpretation Code Description Data Karina

rce(s) Supporting 

Document(s)

 

          SARS coronavirus 2 RNA                                         [facility] 

    

 

                                        This lab was ordered by [facility] and reported by [facility]. 









                    ID                  Date                Data Source

 

                    [id number]             2020 04:59:00 PM EST [facility]









          Name      Value     Range     Interpretation Code Description Data Karina

rce(s) Supporting 

Document(s)

 

                                        SARS coronavirus 2 RNA [Presence] in Res

piratory specimen by RADHA with probe 

detection                                              [facility]      

 

                                        This lab was ordered by [facility] a

nd reported by [facility].

 









                    ID                  Date                Data Source

 

                    [id number]         2020 11:00:00 AM EST [facility]









          Name      Value     Range     Interpretation Code Description Data Karina

rce(s) Supporting 

Document(s)

 

          SARS coronavirus 2 RNA                                         [facility]

    

 

                                        This lab was ordered by [facility] and reported by [facility]. 









                    ID                  Date                Data Source

 

                    LIPID PANEL (CARDIAC RISK) 10/13/2020 07:52:00 AM EDT eCW1 (

[facility])









          Name      Value     Range     Interpretation Code Description Data Karina

rce(s) Supporting 

Document(s)

 

             Triglyceride [Mass/volume] in Serum or Plasma by calculation 164   

                                 

TRIGLYCERIDES LEVEL       eCW1 ([facility])  

 

           Cholesterol [Moles/volume] in Serum or Plasma 207                    

          CHOLESTEROL LEVEL eCW1 

([facility])         

 

             Cholesterol in LDL [Mass/volume] in Serum or Plasma by calculation 

129                                    LDL 

CHOLESTEROL               eCW1 ([facility])  

 

                    162                           NON-HDL-C eCW1 ([facility])  

 

           Cholesterol in HDL [Moles/volume] in Serum or Plasma 45              

                 HDL CHOLESTEROL eCW1

 ([facility])        

 

                    4.600                         CHOLESTEROL RISK RATIO eCW1 ([facility])  









                    ID                  Date                Data Source

 

                    TSH                 10/13/2020 07:52:00 AM EDT eCW1 ([facility])









          Name      Value     Range     Interpretation Code Description Data Karina

rce(s) Supporting 

Document(s)

 

                    7.520                         THYROID STIMULATING HORMONE eC

W1 ([facility])  









                    ID                  Date                Data Source

 

                    ERYTHROCYTE SEDIMENTATION RATE 10/13/2020 07:52:00 AM EDT eC

W1 ([facility])









          Name      Value     Range     Interpretation Code Description Data Karina

rce(s) Supporting 

Document(s)

 

                    126                           ERYTHROCYTE SEDIMENTATION RATE

 eCW1 ([facility])  









                    ID                  Date                Data Source

 

                    C REACTIVE PROTEIN QUANTITATIV (At [facility]) 10/13/2020 07:52

:00 AM EDT eCW1 

([facility])









          Name      Value     Range     Interpretation Code Description Data Karina

rce(s) Supporting 

Document(s)

 

                    5.11                          C REACTIVE PROTEIN QUANTITATIV

 eCW1 ([facility]) 

 









                    ID                  Date                Data Source

 

                    Comprehensive Metabolic Profile (CMP) 10/13/2020 07:52:00 AM

 EDT eCW1 ([facility])









          Name      Value     Range     Interpretation Code Description Data Karina

rce(s) Supporting 

Document(s)

 

                    160                           GLUCOSE, FASTING eCW1 ([facility])  

 

                    2.88                          CREATININE FOR GFR eCW1 ([facility])  

 

                    54                            BLOOD UREA NITROGEN eCW1 ([facility])  

 

                    4.3                           POTASSIUM SERUM eCW1 ([facility])  

 

                    138                           SODIUM LEVEL eCW1 ([facility])  

 

                    22.5                          GLOMERULAR FILTRATION RATE eCW

1 ([facility])  

 

                    106                           CHLORIDE LEVEL eCW1 ([facility])  

 

                    21                            CARBON DIOXIDE LEVEL eCW1 ([facility])  

 

                    8.7                           CALCIUM LEVEL eCW1 ([facility])  

 

                    11                            AST/SGOT  eCW1 ([facility])  

 

                    6.5                           TOTAL PROTEIN eCW1 ([facility])  

 

                    65                            ALKALINE PHOSPHATASE eCW1 ([facility])  

 

                    10                            ALT/SGPT  eCW1 ([facility])  

 

                    0.2                           BILIRUBIN,TOTAL eCW1 ([facility])  

 

                    0.7                           ALBUMIN/GLOBULIN RATIO eCW1 ([facility])  

 

                    2.6                           ALBUMIN   eCW1 ([facility])  









                    ID                  Date                Data Source

 

                    CBC with Differential 10/13/2020 07:52:00 AM EDT eCW1 ([facility])









          Name      Value     Range     Interpretation Code Description Data Karina

rce(s) Supporting 

Document(s)

 

                    7.6                           WHITE BLOOD COUNT eCW1 ([facility])  

 

                    2.30                          RED BLOOD COUNT eCW1 ([facility])  

 

                    6.1                           HEMOGLOBIN eCW1 ([facility])  

 

                    20.3                          HEMATOCRIT eCW1 ([facility])  

 

                    88.3                          MEAN CORPUSCULAR VOLUME eCW1 (

[facility])  

 

                    26.5                          MEAN CORPUSCULAR HEMOGLOBIN eC

W1 ([facility])  

 

                    30.0                          MEAN CORPUSCULAR HGB CONC eCW1

 ([facility])  

 

                    19.1                          RED CELL DISTRIBUTION WIDTH eC

W1 ([facility])  

 

                    260                           PLATELET COUNT, AUTOMATED eCW1

 ([facility])  

 

                    66.3                          NEUTROPHILS % eCW1 ([facility])  

 

                    18.9                          LYMPH %   eCW1 ([facility])  

 

                    10.7                          MONO %    eCW1 ([facility])  

 

                    5.1                           NEUTROPHILS # eCW1 ([facility])  

 

                    1.4                           LYMPH #   eCW1 ([facility])  

 

                    0.3                           BASO %    eCW1 ([facility])  

 

                    3.0                           EOS %     eCW1 ([facility])  

 

                    0.2                           EOS #     eCW1 ([facility])  

 

                    0.8                           MONO #    eCW1 ([facility])  

 

                    0.0                           BASO #    eCW1 ([facility])  







                                        Procedure

 

                                          



                                                                                
                                                                                
                                                                                
                  



Social History

          



           Code       Duration   Value      Status     Description Data Source(s

)

 

           Smoking    10/08/2020 12:00:00 AM EDT Former Smoker completed  Former

 Smoker eCW1 

([facility])

 

           Smoking    10/08/2020 12:00:00 AM EDT Former Smoker completed  Former

 Smoker eCW1 

([facility])

 

           Smoking    10/08/2020 12:00:00 AM EDT Former Smoker completed  Former

 Smoker eCW1 

([facility])

 

           Smoking    2020 12:00:00 AM EDT Former Smoker completed  Former

 Smoker eCW1 

([facility])

 

           Smoking    2020 12:00:00 AM EDT Former Smoker completed  Former

 Smoker eCW1 

([facility])



                                                                                
                                                          



Vital Signs

          



                    ID                  Date                Data Source

 

                    UNK                                      









           Name       Value      Range      Interpretation Code Description Data

 Source(s)

 

           Body surface area Derived from formula 1.76 m2                       

   1.76 m2    [facility] ([facility])

 

           Body weight 63.504 kg                        63.504 kg  [facility] ([facility])

 

           Ideal body weight 154 [lb_av]                       154 [facility]

[facility] ([facility])

 

           Body mass index (BMI) [Ratio] 21.3 kg/m2                       21.3 k

g/m2 [facility] ([facility])

 

           Body weight 140.00 [lb_av]                       140.00 [lb_av] [facility]

[facility] ([facility])

 

           Body height 68 [in_i]                        68 [in_i]  [facility] ([facility])

 

                                        5'8" 

 

           Diastolic blood pressure 60 mm[Hg]                        60 mm[Hg]  

[facility] ([facility] )

 

           Systolic blood pressure 120 mm[Hg]                       120 mm[Hg] M

EDENT ([facility], )

 

           Diastolic blood pressure 72 mm[Hg]                        72 mm[Hg]  

eCW1 ([facility])

 

           Systolic blood pressure 148 mm[Hg]                       148 mm[Hg] e

CW1 ([facility])

 

           Body temperature 98.6 [degF]                       98.6 [degF] eCW1 (

[facility])

 

           Respiratory rate 18 /min                          18 /min    eCW1 ([facility])

 

           Heart rate 90 /min                          90 /min    eCW1 ([facility])

 

           Body mass index (BMI) [Ratio] 22.35 kg/m2                       22.35

 kg/m2 W1 ([facility])

 

           Body height 68 [in_i]                        68 [in_i]  eCW1 ([facility])

 

           Body weight 147 [lb_av]                       147 [lb_av] eCW1 ([facility])

 

           Diastolic blood pressure 75 mm[Hg]                        75 mm[Hg]  

eCW1 ([facility])

 

           Systolic blood pressure 154 mm[Hg]                       154 mm[Hg] e

CW1 ([facility])

 

           Body temperature 98.7 [degF]                       98.7 [degF] eCW1 (

[facility])

 

           Respiratory rate 18 /min                          18 /min    eCW1 ([facility])

 

           Heart rate 90 /min                          90 /min    eCW1 ([facility])

 

           Body mass index (BMI) [Ratio] 24.02 kg/m2                       24.02

 kg/m2 eCW1 ([facility])

 

           Body height 68 [in_i]                        68 [in_i]  eCW1 ([facility])

 

           Body weight 158 [lb_av]                       158 [lb_av] eCW1 ([facility])

 

           Diastolic blood pressure 84 mm[Hg]                        84 mm[Hg]  

eCW1 ([facility])

 

           Systolic blood pressure 188 mm[Hg]                       188 mm[Hg] e

CW1 ([facility])

 

           Body temperature 98.4 [degF]                       98.4 [degF] eCW1 (

[facility])

 

           Respiratory rate 18 /min                          18 /min    eCW1 ([facility])

 

           Heart rate 75 /min                          75 /min    eCW1 ([facility])

 

           Body mass index (BMI) [Ratio] 26.15 kg/m2                       26.15

 kg/m2 eCW1 ([facility])

 

           Body height 68 [in_us]                       68 [in_us] eCW1 ([facility])

 

           Body weight Measured 172 [lb_av]                       172 [lb_av] eC

W1 ([facility])

## 2021-01-20 NOTE — ROOPDOC
[facility] Report Of Operation


Report of Operation


DATE OF PROCEDURE: 1/19/21





PREPROCEDURE DIAGNOSES: sepsis, need for IV access.





POSTPROCEDURE DIAGNOSES: sespsis, pneumonia, need for IV access.





PROCEDURE: insertion of left subclavian triple lumen catheter. 





SURGEON: John Bella MD





ASSISTANT:





ANESTHESIA: local anesthesia with 1% lidocain3.





ESTIMATED BLOOD LOSS: Approximately 3 mL. 





COMPLICATIONS: none, postprocedure CXR seen, adequate position, no 

pneumothrorax. 





REMARKS: 80-year-old male with known history of rectal cancer status post 

diverting colostomy presents with sepsis presumably from bilateral pneumonia, 

mild hypotension. They lost the prophylaxis and could not get any prophylaxis 

and I was asked to place a more stable vascular access for administration of his

medications.





DESCRIPTION OF PROCEDURE:





Consent was obtained from the patient. He is awake, alert and oriented. He 

remain in the room and he was placed on a slight reverse Trendelenburg position.

He was on a continuous cardiac monitor. Oxygen provided via facemask.We paused 

for a surgical timeout using both pre-incision safety checklist to verify 

correct patient, procedure site and additional clinical information prior to 

beginning the procedure.





We used a central line bundle. His left neck and chest is prepped and draped in 

the usual sterile fashion. Patient has a very thin body habitus with visible 

clavicle and ribs. Using an infraclavicular approach the finder needle was 

inserted subcutaneously towards the manubrium sternum from the medial two thirds

of the clavicle and the subclavian vein was located with 2 sticks. Guidewire was

threaded through the needle and the needle was removed. Using modified sounder 

technique the tract was dilated and exchanged for a triple-lumen catheter. This 

was placed at 18 cm at the skin level. All 3 ports were aspirating and flushing 

well. The catheter was secured to the skin in an antibiotic-containing 

nonocclusive dressing placed. Patient remained stable throughout the procedure.





A postoperative chest x-ray was taken and have reviewed the images myself. This 

shows good position without any evidence of pneumothorax.











JOHN BELLA MD         Jan 20, 2021 12:42

## 2021-01-20 NOTE — IPNPDOC
Text Note


Date of Service


The patient was seen on 21.





NOTE


SUBJECTIVE:


-Was transferred to ICU yesterday for hypotension with borderline septic shock 

and then later back to PCU after fluids response. However overnight was 

hypotensive and received 2 more liters of NS for hypotension


-Still remains lethargic, now on 2L NC





PHYSICAL EXAMINATION:


VITAL SIGNS: see below. BP this AM is 121/57, on 2L NC, afebrile


GENERAL: Arousable but lethargic


HEENT: PERRLA, EOMI, dry MM


NECK: Supple, no noted JVD


CHEST/LUNGS: Decreased breath sounds bilaterally with with posterior bibasilar 

crackles and rhonchi bilaterally


HEART: Regular rate and rhythm. No murmurs, rubs, or gallops are appreciated. 


ABDOMEN:  Normoactive bowel sounds, soft, nontender, and nondistended.  

Colostomy bag is present in the left lower quadrant with brown liquid stool. 

Negative Tamayo's. no rebound or involuntary guarding


EXTREMITIES: 2+ pitting edema up to the ankles. WWP


SKIN: Scattered bruises on both upper and lower extremities


NEUROLOGIC: Lethargic, moving all extremities, no facial asymmetry





LABORATORY DATA: reviewed


WBC 15.1


UA was +++ --> with pending urine Cx


hgb 9


platelets 216


Cr 1.64


K 3.3 (repleted)


Mag 1.2 (repleted)


Na 148





IMAGING: 





CT HEAD:


FINDINGS: 


Brain: Cerebral volume loss and chronic white matter changes. Chronic basal 


ganglia lacunar infarctions are not significantly changed. No intracranial 


hemorrhage. No midline shift or herniation. 


Cerebral ventricles: No ventriculomegaly. 


Bones/joints: Unremarkable. No acute fracture. 


Paranasal sinuses: Visualized sinuses are unremarkable. No fluid levels. 


Mastoid air cells: Visualized mastoid air cells are well aerated. 


Soft tissues: Unremarkable. 


IMPRESSION: 


1. Cerebral volume loss and chronic white matter changes. 


2. No acute intracranial abnormality. 





CXR:


FINDINGS: 


Lungs: Left greater than right basilar airspace consolidation. 


Pleural space:  No pleural effusion. No pneumothorax. 


Heart/Mediastinum: Unremarkable. No cardiomegaly. 


Bones/joints: Unremarkable. 


Intraperitoneal space: There is pneumoperitoneum under the right hemidiaphragm. 


Gastrointestinal tract: Mild gaseous distention of visualized bowel in the 


upper abdomen. 


IMPRESSION: 


1. Pneumoperitoneum. 


2. Bibasilar pneumonia, worse on the left. 


3. Gaseous distention of visualized bowel in the upper abdomen. 





CHEST CT:


FINDINGS: 


Lungs: There is multifocal airspace consolidation in both lungs, worse on the 


left. 


Pleural space: Small left pleural effusion. No pneumothorax. 


Heart: Normal heart size. Mild coronary artery atherosclerotic disease. 


Aorta: Unremarkable. No aortic aneurysm. 


Lymph nodes: Unremarkable. No enlarged lymph nodes. 


Bones/joints: Skeletal degenerative changes are noted. 


Soft tissues: Unremarkable. 


IMPRESSION: 


1. Bilateral pneumonia, worse on the left. 


2. Small left pleural effusion. 


3. Coronary artery disease.





CT ABD/PEL:


IMPRESSION: 


1. Mild distention of the colon proximal to the ostomy. Wall thickening in the 


splenic flexure may be an artifact related to nondistention. No proximal 


colonic obstruction is seen. 


2. Worsening fluid dilation of the colon distal to the left lower quadrant 


suture line. Stable appearance of irregular circumferential mass in the rectum 


with sinus tract extending to the right  internus muscle. 


3. No pneumoperitoneum or abscess. Air and of the right hemidiaphragm on the 


prior chest radiograph is due to colonic interposition in the right upper 


quadrant. 


4. Thick-walled urinary bladder suggesting cystitis. Hydronephrosis has 


resolved. 





MICROBIOLOGY: Please see below. 





ASSESSMENT: 79 YO M with history of recently diagnosed rectal cancer s/p 

colostomy and bladder outlet obstruction requiring chronic indwelling laureano who 

was brought in from [facility] after being found obtunded, unresponsive with vomitus on

his pillow and admitted for sepsis 2/2 bilateral multifocal PNA and CAUTI. 





PLAN:





1. Sepsis, bordering septic shock: 2/2 bilateral multifocal PNA and possible 

CAUTI


-Temp 101.3, , RR 30, Lactic acidosis, suspected source


-continue Vancomycin/Zosyn, day 3


-MRSA +


-Switch fluids to 20meqK in 1/2NS continue @ 150cc/hr 


-aspiration precautions


-resp panel was negative


-supplemental O2


-MAP goal >65


-TLC in place, appreciate Dr. Bella's help with placement





2. Acute hypoxic respiratory failure 2/2 bilateral pneumonia:


-Respiratory panel negative


-AB.229/19.6/59


-Empiric antibiotics as stated above


-procalcitonin, CRP, sputum culture, Legionella, urine strep pending





3. ELISHA on CKD III: improving


-Cr found to be 2.09, improving with fluids


-Difficult to determine baseline Cr, but appears to be 1.5


-receiving aggressive fluids, f/u repeat BMP





4. Chronic indwelling laureano with possible CAUTI:


-Nursing notes urine was cloudy on admission


-UA positive for possible UTI although patient may be chronically colonized due 

to his chronic laureano


-Covered with empiric antibiotics as above


-Laureano was exchanged





5. Recent vomiting with ongoing lethargy:


-NPO for now


-Aspiration precautions, elevate head of bed





6. Lethargy likely metabolic encephalopathy i/s/o sepsis


-CT head without acute pathology


-septic --> treatment as noted above





DVT ppx: SQH





DISPO:Pending clinical improvement. Patient is DNR/DNI





VS,Fishbone, I+O


VS, Aidae, I+O


Laboratory Tests


21 02:30








21 06:03











Vital Signs








  Date Time  Temp Pulse Resp B/P (MAP) Pulse Ox O2 Delivery O2 Flow Rate FiO2


 


21 08:00       2.0 


 


21 08:00 97.3 93 20 121/57 (78) 92 Room Air  














I&O- Last 24 Hours up to 6 AM 


 


 21





 06:00


 


Intake Total 4795 ml


 


Output Total 630 ml


 


Balance 4165 ml

















NAVI BLAKE MD   2021 08:47

## 2021-01-21 NOTE — ECGEPIP
[facility]

                                       

                                       Test Date:    2021

Pat Name:     VIRGINIA MEDRANO          Department:   

Patient ID:   [id number]                 Room:         [place] [address]

Gender:       Male                     Technician:   LEOBARDO

:          1940               Requested By: NAVI PINEDA

Order Number: [id number]     Reading MD:   Payal Smyth

                                 Measurements

Intervals                              Axis          

Rate:         117                      P:            44

HI:           135                      QRS:          7

QRSD:         86                       T:            16

QT:           303                                    

QTc:          424                                    

                           Interpretive Statements

SINUS TACHYCARDIA

LOW QRS VOLTAGE IN EXTREMITY LEADS

Similar to 1/10/21

Electronically Signed on 2021 16:05:25 EST by Payal Smyth

## 2021-01-21 NOTE — REP
INDICATION:

r/o DVT.



COMPARISON:

Comparison sonography November 11, 2019..



TECHNIQUE:

Bilateral lower extremity duplex venous ultrasound.



FINDINGS:

There is extensive bilateral occlusive deep vein thrombosis extending from the

popliteal veins proximal to the common femoral vein segments bilaterally.  The femoral

veins are partially duplicated and the duplicated segments are patent..





IMPRESSION:

Positive study for extensive occlusive bilateral lower extremity deep venous

thrombosis, extending from the common femoral veins distally to the popliteal veins..







<Electronically signed by Onur Oviedo > 01/21/21 [id number]

## 2021-01-21 NOTE — IPNPDOC
Text Note


Date of Service


The patient was seen on 21.





NOTE


SUBJECTIVE:


-Finally a bit more awake and verbal. On 2L NC





PHYSICAL EXAMINATION:


VITAL SIGNS: see below. BP this AM is 121/57, on 2L NC, afebrile


GENERAL: More alert this AM, some speech and oriented, ill appearing


HEENT: PERRLA, EOMI, dry MM


NECK: Supple, no noted JVD


CHEST/LUNGS: Decreased breath sounds bilaterally with with posterior bibasilar 

crackles and rhonchi bilaterally


HEART: Regular rate and rhythm. No murmurs, rubs, or gallops are appreciated. 


ABDOMEN:  Normoactive bowel sounds, soft, nontender, and nondistended.  

Colostomy bag is present in the left lower quadrant with brown liquid stool. 

Negative Tamayo's. no rebound or involuntary guarding


EXTREMITIES: 2+ pitting edema up to the ankles. WWP


SKIN: Scattered bruises on both upper and lower extremities


NEUROLOGIC: moving all extremities, no facial asymmetry





LABORATORY DATA: reviewed


K 3.0 (repleted)


Cr 1.75


Micro: staph epi, methicillin resistant in urine





IMAGING: 





CT HEAD:


FINDINGS: 


Brain: Cerebral volume loss and chronic white matter changes. Chronic basal 


ganglia lacunar infarctions are not significantly changed. No intracranial 


hemorrhage. No midline shift or herniation. 


Cerebral ventricles: No ventriculomegaly. 


Bones/joints: Unremarkable. No acute fracture. 


Paranasal sinuses: Visualized sinuses are unremarkable. No fluid levels. 


Mastoid air cells: Visualized mastoid air cells are well aerated. 


Soft tissues: Unremarkable. 


IMPRESSION: 


1. Cerebral volume loss and chronic white matter changes. 


2. No acute intracranial abnormality. 





CXR:


FINDINGS: 


Lungs: Left greater than right basilar airspace consolidation. 


Pleural space:  No pleural effusion. No pneumothorax. 


Heart/Mediastinum: Unremarkable. No cardiomegaly. 


Bones/joints: Unremarkable. 


Intraperitoneal space: There is pneumoperitoneum under the right hemidiaphragm. 


Gastrointestinal tract: Mild gaseous distention of visualized bowel in the 


upper abdomen. 


IMPRESSION: 


1. Pneumoperitoneum. 


2. Bibasilar pneumonia, worse on the left. 


3. Gaseous distention of visualized bowel in the upper abdomen. 





CHEST CT:


FINDINGS: 


Lungs: There is multifocal airspace consolidation in both lungs, worse on the 


left. 


Pleural space: Small left pleural effusion. No pneumothorax. 


Heart: Normal heart size. Mild coronary artery atherosclerotic disease. 


Aorta: Unremarkable. No aortic aneurysm. 


Lymph nodes: Unremarkable. No enlarged lymph nodes. 


Bones/joints: Skeletal degenerative changes are noted. 


Soft tissues: Unremarkable. 


IMPRESSION: 


1. Bilateral pneumonia, worse on the left. 


2. Small left pleural effusion. 


3. Coronary artery disease.





CT ABD/PEL:


IMPRESSION: 


1. Mild distention of the colon proximal to the ostomy. Wall thickening in the 


splenic flexure may be an artifact related to nondistention. No proximal 


colonic obstruction is seen. 


2. Worsening fluid dilation of the colon distal to the left lower quadrant 


suture line. Stable appearance of irregular circumferential mass in the rectum 


with sinus tract extending to the right  internus muscle. 


3. No pneumoperitoneum or abscess. Air and of the right hemidiaphragm on the 


prior chest radiograph is due to colonic interposition in the right upper 


quadrant. 


4. Thick-walled urinary bladder suggesting cystitis. Hydronephrosis has 


resolved. 





MICROBIOLOGY: Please see below. 





ASSESSMENT: 81 YO M with history of recently diagnosed rectal cancer s/p 

colostomy and bladder outlet obstruction requiring chronic indwelling laureano who 

was brought in from [facility] after being found obtunded, unresponsive with vomitus on

his pillow and admitted for sepsis 2/2 bilateral multifocal PNA and CAUTI. 





PLAN:





1. Sepsis, bordering septic shock: 2/2 bilateral multifocal PNA and possible 

CAUTI


-Temp 101.3, , RR 30, Lactic acidosis, suspected source


-continue Vancomycin/Zosyn, day 4


-MRSA +


-aspiration precautions


-resp panel was negative


-supplemental O2


-MAP goal >65


-TLC in place, appreciate Dr. Bella's help with placement





2. Acute hypoxic respiratory failure 2/2 bilateral pneumonia:


-Respiratory panel negative


-AB.229/19.6/59


-Empiric antibiotics as stated above


-procalcitonin, CRP, sputum culture, Legionella, urine strep pending





3. ELISHA on CKD III: improving


-Cr found to be 2.09, improving with fluids


-Difficult to determine baseline Cr, but appears to be 1.5


-receiving aggressive fluids, f/u repeat BMP





4. Chronic indwelling laureano with methicillin resistant staph epi CAUTI:


-Covered with vanc, day 4


-Laureano was exchanged





5. Recent vomiting with recent lethargy:


-Now more awake --> will do speech bedside swallow eval before beginning a diet


-Aspiration precautions, elevate head of bed





6. Lethargy likely metabolic encephalopathy i/s/o sepsis


-CT head without acute pathology


-septic --> treatment as noted above





DVT ppx: SQH





DISPO:Pending clinical improvement. Patient is DNR/DNI





VS,Fishbone, I+O


VS, Fishbone, I+O


Laboratory Tests


21 04:30











Vital Signs








  Date Time  Temp Pulse Resp B/P (MAP) Pulse Ox O2 Delivery O2 Flow Rate FiO2


 


21 05:03  89 28 128/65 (86) 99 Nasal Cannula 2.0 


 


21 04:00 97.5       














I&O- Last 24 Hours up to 6 AM 


 


 21





 05:59


 


Intake Total 4644 ml


 


Output Total 875 ml


 


Balance 3769 ml

















NAVI BLAKE MD   2021 10:21

## 2021-01-21 NOTE — REP
INDICATION:

AMS, tachypnea, hypoxemia.



COMPARISON:

Comparison portable chest x-ray January 19, 2021.



TECHNIQUE:

Portable upright AP chest radiograph.



FINDINGS:

The previously noted left perihilar infiltrate is again seen and appears a little more

opaque.  There is more extensive interstitial infiltrate in the left superior

perihilar region.  A new infiltrate is noted in the right upper perihilar region.

Patchy infiltrates are seen in the right base as well.  A left-sided central venous

line is again noted in place.  Monitoring electrodes are seen.  Heart size is

unchanged and not enlarged..



IMPRESSION:

Progressive infiltrates bilaterally.  New area of infiltrate right upper lobe region..





<Electronically signed by Onur Oviedo > 01/21/21 [id number]

## 2021-01-22 NOTE — DS.PDOC
Discharge Summary


General


Date of Admission


2021 at 06:02


Date of Discharge


2021


Attending Physician:  NAVI BLAKE MD





Discharge Summary


PROCEDURES PERFORMED DURING STAY: Subclavian TLC placement.





ADMITTING DIAGNOSES: 


Sepsis


CAP





DISCHARGE DIAGNOSES:


Severe sepsis


CAP


Mixed winnie CAUTI


Hypoxemic respiratory failure 


Acute extensive occlusive bilateral lower extremity deep venous thrombosis


Probable pulmonary embolism


HTN


Hx DVT in RLE 


Rectal cancer with rectal obstruction s/p laparoscopy with end-colostomy


Bladder outlet obstruction with chronic indwelling laureano catheter


Hx pulmonary nodules


B12 deficiency


Small vessel cerebrovascular disease


CKD 3


Gastric polyps


Adenomyomatosis of the gallbladder


Kidney and liver cysts


Subclinical hypothyroidism





COMPLICATIONS/CHIEF COMPLAINT: Pneumonia, Sepsis.





HISTORY OF PRESENT ILLNESS: 


Gustavo Hanna was an 81 YO M with history of recently resected rectal cancer, 

bladder outlet obstruction with chronic indwelling laureano catheter who presents 

to ED from [facility] after being found obtunded and unresponsive with vomit in his 

bed. Per [facility] nursing, patient was alert and oriented and joking with the staff 

prior to bed the evening prior. He was found at approximately 0120 with vomit on

his pillowcase. He was responsive to voice, opening his eyes but did not 

converse. 





HOSPITAL COURSE:


Upon arrival to the ED it was noted that the patient had cloudy urine in his 

existing Laureano and bright red blood coming from his rectum. He was also found to

be febrile with a rectal temperature of 103.1. He was started on vancomycin and 

zosyn with imaging showing PNA and  UA grossly positive. He was admitted to the 

ICU for severe sepsis with borderline septic shock but was responsive to 

aggressive fluid rescuscitation. He continued to be encephalopathic over the 

next 72h and eventually was alert and speaking on  AM. He unfortunately 

acutely became unresponsive and his peripheries were noted to be mottling, cold 

and LE were dramatically more edematous than prior. He was immediately started 

on a heparin gtt for potential VTE with concern for PE and bilateral LE US 

confirmed extensive DVTs. His oxygen requirements increased and  his breathing 

became agonal. Being DNR/DNI, I called his daughter to discuss his course and 

GOC and requested that if she desired it would be appropriate to come in and 

visit with him. She came in and decided to make him CMO and  at 7.15 PM 

on 2021 with daughter at bedside.





DISCHARGE MEDICATIONS: Please see below.


 


ALLERGIES: Please see below.





PHYSICAL EXAMINATION ON DISCHARGE:


I WAS NOT PRESENT WHEN HE  AS IT WAS OVERNIGHT.





LABORATORY DATA: Please see below.





IMAGING: 


head CT:


1. Cerebral volume loss and chronic white matter changes. 


2. No acute intracranial abnormality. 





Admission CXR:


1. Pneumoperitoneum. 


2. Bibasilar pneumonia, worse on the left. 


3. Gaseous distention of visualized bowel in the upper abdomen. 








Chest CT:


Lungs: There is multifocal airspace consolidation in both lungs, worse on the 


left. 


Pleural space: Small left pleural effusion. No pneumothorax. 


Heart: Normal heart size. Mild coronary artery atherosclerotic disease. 


Aorta: Unremarkable. No aortic aneurysm. 


Lymph nodes: Unremarkable. No enlarged lymph nodes. 





Bones/joints: Skeletal degenerative changes are noted. 


Soft tissues: Unremarkable. 





IMPRESSION: 


1. Bilateral pneumonia, worse on the left. 


2. Small left pleural effusion. 


3. Coronary artery disease. 





CT A/P:


Liver: Multiple cysts in the liver are unchanged. No new liver masses. There is 


interposition of the hepatic flexure between the liver in the right 


hemidiaphragm. 


Gallbladder and bile ducts: Normal. No calcified stones. No ductal dilation. 


Pancreas: 1.7 cm cyst in the pancreatic tail is unchanged. No new pancreatic 


mass or duct dilation. 


Spleen: Normal. No splenomegaly. 


Adrenal glands: Left adrenal hyperplasia. 


Kidneys and ureters: Multiple bilateral renal cysts are unchanged. 


Hydronephrosis seen on the prior exam has resolved. 


Stomach and bowel: Stomach is mildly distended with air. No gastric outlet 


obstruction. Irregular circumferential rectal mass with surrounding nodularity 


and presacral soft tissue edema is not significantly changed. There is a tract 


extending through the right rectal wall to the right  internus muscle 


with associated muscular thickening and edema, not significantly changed. The 


rectum and sigmoid colon proximal to the mass is severely dilated with fluid, 


more severe than on the prior exam. The colon remains dilated to a suture line 


in the left pelvis. The colon proximal to the ostomy is dilated with air and 


fluid, slightly worse since the previous exam. Mild wall thickening in the 


splenic flexure of the colon. Colonic diverticulosis is present. Small bowel is 


unremarkable. 


Appendix: Not visualized. 





Intraperitoneal space: No pneumoperitoneum or abscess. Vasculature: Fusiform 


infrarenal abdominal aortic aneurysm measuring 3.0 cm is unchanged. 


Vasculature: Unremarkable. No abdominal aortic aneurysm. 


Lymph nodes: Unremarkable. No enlarged lymph nodes. 


Urinary bladder: There is a Laureano catheter in the urinary bladder. There is 


circumferential wall thickening and calculi in the urinary bladder. 


Reproductive: Prostate gland is enlarged. 


Bones/joints: There are advanced degenerative changes in the spine and pelvis. 


Small right hip joint effusion. Chronic L2 compression fracture. 


Soft tissues: Generalized subcutaneous edema. 





IMPRESSION: 


1. Mild distention of the colon proximal to the ostomy. Wall thickening in the 


splenic flexure may be an artifact related to nondistention. No proximal 


colonic obstruction is seen. 


2. Worsening fluid dilation of the colon distal to the left lower quadrant 


suture line. Stable appearance of irregular circumferential mass in the rectum 


with sinus tract extending to the right  internus muscle. 


3. No pneumoperitoneum or abscess. Air and of the right hemidiaphragm on the 


prior chest radiograph is due to colonic interposition in the right upper 


quadrant. 


4. Thick-walled urinary bladder suggesting cystitis. Hydronephrosis has 


resolved. 





Post TLC placement CXR:


Left perihilar and bibasilar infiltrates unchanged.  Left subclavian line it 

appears


in good position.  No complication is seen.





2021 CXR:


Progressive infiltrates bilaterally.  New area of infiltrate right upper lobe 

region.





 LE bilateral venous doppler US:


There is extensive bilateral occlusive deep vein thrombosis extending from the


popliteal veins proximal to the common femoral vein segments bilaterally.  The 

femoral


veins are partially duplicated and the duplicated segments are patent..





IMPRESSION:


Positive study for extensive occlusive bilateral lower extremity deep venous


thrombosis, extending from the common femoral veins distally to the popliteal 

veins..





PROGNOSIS: 





ACTIVITY: 





DIET: 





DISCHARGE PLAN: 





DISPOSITION: 20 .





DISCHARGE INSTRUCTIONS:








ITEMS TO FOLLOWUP ON ON OUTPATIENT:








DISCHARGE CONDITION: 





TIME SPENT ON DISCHARGE: 43 minutes.





Vital Signs/I&Os





Vital Signs








  Date Time  Temp Pulse Resp B/P (MAP) Pulse Ox O2 Delivery O2 Flow Rate FiO2


 


21 17:53     94 HVNI-Vapotherm 15.0 100


 


21 16:00 96.2 116 42 108/62 (77)    














I&O- Last 24 Hours up to 6 AM 


 


 21





 06:00


 


Intake Total 50 ml


 


Output Total 0 ml


 


Balance 50 ml











Laboratory Data


Labs 24H


Laboratory Tests 2


21 13:02: 


Blood Gas Bicarbonate Standard 9.4L, Arterial Blood pH 7.211*L, Arterial Blood 

Partial Pressure CO2 13.2*L, Arterial Blood Partial Pressure O2 68.9L, Arterial 

Blood Total CO2 5.6L, Arterial Blood HCO3 5.2L, Arterial Blood Base Excess -

20.3L, Arterial Blood Oxygen Saturation 90.7L


21 14:16: Troponin I < 0.02


21 14:17: 


Activated Partial Thromboplast Time 31.5, D-Dimer, Quantitative > 4000H, Lactic 

Acid Level 4.1*H


21 15:58: Coronavirus (COVID-19)(PCR) NEGATIVE





Microbiology





Microbiology


21 Respiratory Virus Panel (PCR) (ELLA) - Final, Complete


          


21 Blood Culture - Preliminary, Resulted


          No Growth after 72 hours. All specime...


21 Blood Culture - Preliminary, Resulted


          No Growth after 72 hours. All specime...


21 Urine Culture - Final, Complete


          Staphylococcus Epidermidis





Discharge Medications


Scheduled


Cyanocobalamin (Vitamin B-12) (B-12) 1,000 Mcg Tablet, 1,000 MCG PO DAILY, 

(Reported)


Duloxetine Hcl (Duloxetine HCl) 60 Mg Capsule.dr, 60 MG PO DAILY, (Reported)


Lactose-Reduced Food (Ensure Enlive) 237 Ml Liquid, 237 ML PO TID, (Reported)


   0900, 1300, 2000 


Mirtazapine (Remeron) 15 Mg Tablet, 15 MG PO QHS, (Reported)


Sennosides/Docusate Sodium (Senna Plus Tablet) 1 Each Tablet, 2 TAB PO DAILY, 

(Reported)





Scheduled PRN


Acetaminophen (Tylenol Extra Strength) 500 Mg Tablet, 500 MG PO Q6H PRN for 

PAIN, (Reported)


Ibuprofen (Ibuprofen) 200 Mg Tablet, 400 MG PO Q6H PRN for PAIN OR DISCOMFORT, 

(Reported)


Milk Of Magnesia (Milk of Magnesia) 2,400 Mg/10 Ml Oral.susp, 10 ML PO DAILY PRN

for CONSTIPATION, (Reported)





Allergies


Coded Allergies:  


     sulfamethoxazole (Verified  Allergy, Unknown, 12/15/20)











NAVI BLAKE MD   2021 09:47